# Patient Record
Sex: MALE | Race: WHITE | NOT HISPANIC OR LATINO | Employment: OTHER | URBAN - METROPOLITAN AREA
[De-identification: names, ages, dates, MRNs, and addresses within clinical notes are randomized per-mention and may not be internally consistent; named-entity substitution may affect disease eponyms.]

---

## 2019-03-27 ENCOUNTER — OFFICE VISIT (OUTPATIENT)
Dept: GASTROENTEROLOGY | Facility: CLINIC | Age: 64
End: 2019-03-27
Payer: OTHER GOVERNMENT

## 2019-03-27 VITALS
TEMPERATURE: 97.7 F | SYSTOLIC BLOOD PRESSURE: 133 MMHG | BODY MASS INDEX: 32.65 KG/M2 | HEIGHT: 71 IN | HEART RATE: 65 BPM | DIASTOLIC BLOOD PRESSURE: 75 MMHG | WEIGHT: 233.2 LBS

## 2019-03-27 DIAGNOSIS — Z86.010 HISTORY OF COLON POLYPS: Primary | ICD-10-CM

## 2019-03-27 DIAGNOSIS — K21.9 GASTROESOPHAGEAL REFLUX DISEASE, ESOPHAGITIS PRESENCE NOT SPECIFIED: ICD-10-CM

## 2019-03-27 PROBLEM — Z86.0100 HISTORY OF COLON POLYPS: Status: ACTIVE | Noted: 2019-03-27

## 2019-03-27 PROCEDURE — 99204 OFFICE O/P NEW MOD 45 MIN: CPT | Performed by: INTERNAL MEDICINE

## 2019-03-27 RX ORDER — HYDROXYCHLOROQUINE SULFATE 200 MG/1
200 TABLET, FILM COATED ORAL 2 TIMES DAILY WITH MEALS
COMMUNITY

## 2019-03-27 RX ORDER — LOSARTAN POTASSIUM 25 MG/1
12.5 TABLET ORAL
COMMUNITY

## 2019-03-27 NOTE — PROGRESS NOTES
[facility] - Outpatient Consultation  Ismael Braun 62 y o  male MRN: [id number]  Encounter: [id number]          ASSESSMENT AND PLAN:      There are no diagnoses linked to this encounter   ______________________________________________________________________    HPI:  Ismael Braun is a 62 y o  male is here for a colon consultation  REVIEW OF SYSTEMS:    CONSTITUTIONAL: Denies any fever, chills, rigors, and weight loss  HEENT: No earache or tinnitus  Denies hearing loss or visual disturbances  CARDIOVASCULAR: No chest pain or palpitations  RESPIRATORY: Denies any cough, hemoptysis, shortness of breath or dyspnea on exertion  GASTROINTESTINAL: As noted in the History of Present Illness  GENITOURINARY: No problems with urination  Denies any hematuria or dysuria  NEUROLOGIC: No dizziness or vertigo, denies headaches  MUSCULOSKELETAL: Denies any muscle or joint pain  SKIN: Denies skin rashes or itching  ENDOCRINE: Denies excessive thirst  Denies intolerance to heat or cold  PSYCHOSOCIAL: Denies depression or anxiety  Denies any recent memory loss  Historical Information   No past medical history on file  No past surgical history on file  Social History   Social History     Substance and Sexual Activity   Alcohol Use Not on file     Social History     Substance and Sexual Activity   Drug Use Not on file     Social History     Tobacco Use   Smoking Status Not on file     No family history on file  Meds/Allergies     No current outpatient medications on file  Allergies not on file        Objective     There were no vitals taken for this visit  There is no height or weight on file to calculate BMI          PHYSICAL EXAM:      General Appearance:   Alert, cooperative, no distress   HEENT:   Normocephalic, atraumatic, anicteric      Neck:  Supple, symmetrical, trachea midline   Lungs:   Clear to auscultation bilaterally; no rales, rhonchi or wheezing; respirations unlabored    Heart[de-identified]   Regular rate and rhythm; no murmur, rub, or gallop  Abdomen:   Soft, non-tender, non-distended; normal bowel sounds; no masses, no organomegaly    Genitalia:   Deferred    Rectal:   Deferred    Extremities:  No cyanosis, clubbing or edema    Pulses:  2+ and symmetric    Skin:  No jaundice, rashes, or lesions    Lymph nodes:  No palpable cervical lymphadenopathy        Lab Results:   No visits with results within 1 Day(s) from this visit  Latest known visit with results is:   No results found for any previous visit  Radiology Results:   No results found  Attestation:   By signing my name below, Reece Yap, attest that this documentation has been prepared under the direction and in the presence of Min Del Real MD  Electronically Signed: Ge Jacinto  3/27/19      I,Rico Mancini, personally performed the services described in this documentation  All medical record entries made by the alinaibgraham were at my direction and in my presence  I have reviewed the chart and discharge instructions and agree that the record reflects my personal performance and is accurate and complete   Min Del Real MD  3/27/19

## 2019-03-27 NOTE — PATIENT INSTRUCTIONS
Colon scheduled 5/2/19 at [facility] with [facility]   Supr instructions and sample given in office

## 2019-03-27 NOTE — PROGRESS NOTES
[facility] - Outpatient Consultation  Sherolyn Cockayne 62 y o  male MRN: [id number]  Encounter: [id number]          ASSESSMENT AND PLAN:      1  History of colon polyps  - Patient is 62 y o , has been screened for colon cancer in the past, last colonoscopy was about 10 years ago, he has history of juvenile polyps, denies any family history of GI malignancy or IBD, no alarm symptoms at this point, currently having normal bowel movements  - Will perform colonoscopy, risk and benefits of the procedure were discussed with the patient including but not limited to bleeding, infection, perforation and missing an adenoma  2  GERD  Patient has long-standing history of GERD currently on Tums and his symptoms are controlled  Patient is a former smoker and a social drinker  Previous endoscopy was 1 year ago, as per the patient he has no history of Nair's esophagus  Will continue with current treatment    ______________________________________________________________________    HPI:    61-year-old male patient with past medical history significant for hypertension and mild arthritis  Patient was seen and examined, denies any recent events, currently is tolerating PO route, denies nausea or vomiting, is passing gases and having daily bowel movements of normal consistency, denies abdominal pain, no recent weight loss  Patient disclose a previous colonoscopy 5-10 years ago,at that time he was found to have colonic polyps but he is unaware if polyps were adenoma, he also has history of a polyp 1 he was a kid, the polyp was found by his mother in the stool  REVIEW OF SYSTEMS:    CONSTITUTIONAL: Denies any fever, chills, rigors, and weight loss  HEENT: No earache or tinnitus  Denies hearing loss or visual disturbances  CARDIOVASCULAR: No chest pain or palpitations  RESPIRATORY: Denies any cough, hemoptysis, shortness of breath or dyspnea on exertion    GASTROINTESTINAL: As noted in the History of Present Illness  GENITOURINARY: No problems with urination  Denies any hematuria or dysuria  NEUROLOGIC: No dizziness or vertigo, denies headaches  MUSCULOSKELETAL: Denies any muscle or joint pain  SKIN: Denies skin rashes or itching  ENDOCRINE: Denies excessive thirst  Denies intolerance to heat or cold  PSYCHOSOCIAL: Denies depression or anxiety  Denies any recent memory loss  Historical Information   History reviewed  No pertinent past medical history  Past Surgical History:   Procedure Laterality Date    APPENDECTOMY      REPLACEMENT TOTAL KNEE       Social History   Social History     Substance and Sexual Activity   Alcohol Use Yes     Social History     Substance and Sexual Activity   Drug Use Never     Social History     Tobacco Use   Smoking Status Former Smoker   Smokeless Tobacco Never Used     History reviewed  No pertinent family history  Meds/Allergies       Current Outpatient Medications:     hydroxychloroquine (PLAQUENIL) 200 mg tablet    losartan (COZAAR) 25 mg tablet    Allergies   Allergen Reactions    Statins Myalgia           Objective     Blood pressure 133/75, pulse 65, temperature 97 7 °F (36 5 °C), temperature source Tympanic, height 5' 11" (1 803 m), weight 106 kg (233 lb 3 2 oz)  Body mass index is 32 52 kg/m²  PHYSICAL EXAM:      General Appearance:   Alert, cooperative, no distress   HEENT:   Normocephalic, atraumatic, anicteric      Neck:  Supple, symmetrical, trachea midline   Lungs:   Clear to auscultation bilaterally; no rales, rhonchi or wheezing; respirations unlabored    Heart[de-identified]   Regular rate and rhythm; no murmur, rub, or gallop     Abdomen:   Soft, non-tender, non-distended; normal bowel sounds; no masses, no organomegaly    Genitalia:   Deferred    Rectal:   Deferred    Extremities:  No cyanosis, clubbing or edema    Pulses:  2+ and symmetric    Skin:  No jaundice, rashes, or lesions    Lymph nodes:  No palpable cervical lymphadenopathy  Lab Results:   No visits with results within 1 Day(s) from this visit  Latest known visit with results is:   No results found for any previous visit  Radiology Results:   No results found

## 2019-04-22 ENCOUNTER — ANESTHESIA EVENT (OUTPATIENT)
Dept: PERIOP | Facility: AMBULARY SURGERY CENTER | Age: 64
End: 2019-04-22
Payer: OTHER GOVERNMENT

## 2019-05-02 ENCOUNTER — HOSPITAL ENCOUNTER (OUTPATIENT)
Facility: AMBULARY SURGERY CENTER | Age: 64
Setting detail: OUTPATIENT SURGERY
Discharge: HOME | End: 2019-05-02
Attending: INTERNAL MEDICINE | Admitting: INTERNAL MEDICINE
Payer: OTHER GOVERNMENT

## 2019-05-02 ENCOUNTER — ANESTHESIA (OUTPATIENT)
Dept: PERIOP | Facility: AMBULARY SURGERY CENTER | Age: 64
End: 2019-05-02
Payer: OTHER GOVERNMENT

## 2019-05-02 VITALS
HEART RATE: 60 BPM | SYSTOLIC BLOOD PRESSURE: 111 MMHG | OXYGEN SATURATION: 96 % | BODY MASS INDEX: 31.5 KG/M2 | DIASTOLIC BLOOD PRESSURE: 62 MMHG | RESPIRATION RATE: 18 BRPM | HEIGHT: 71 IN | TEMPERATURE: 97 F | WEIGHT: 225 LBS

## 2019-05-02 DIAGNOSIS — Z86.010 HISTORY OF COLON POLYPS: ICD-10-CM

## 2019-05-02 PROCEDURE — 88305 TISSUE EXAM BY PATHOLOGIST: CPT | Performed by: PATHOLOGY

## 2019-05-02 PROCEDURE — NC001 PR NO CHARGE: Performed by: INTERNAL MEDICINE

## 2019-05-02 PROCEDURE — 45385 COLONOSCOPY W/LESION REMOVAL: CPT | Performed by: INTERNAL MEDICINE

## 2019-05-02 RX ORDER — AMPICILLIN TRIHYDRATE 250 MG
1000 CAPSULE ORAL DAILY
COMMUNITY

## 2019-05-02 RX ORDER — POTASSIUM CHLORIDE 750 MG/1
10 TABLET, EXTENDED RELEASE ORAL DAILY
COMMUNITY

## 2019-05-02 RX ORDER — MELATONIN
1000 DAILY
COMMUNITY

## 2019-05-02 RX ORDER — PROPOFOL 10 MG/ML
INJECTION, EMULSION INTRAVENOUS AS NEEDED
Status: DISCONTINUED | OUTPATIENT
Start: 2019-05-02 | End: 2019-05-02 | Stop reason: SURG

## 2019-05-02 RX ORDER — SODIUM CHLORIDE 9 MG/ML
100 INJECTION, SOLUTION INTRAVENOUS CONTINUOUS
Status: CANCELLED | OUTPATIENT
Start: 2019-05-02

## 2019-05-02 RX ORDER — LIDOCAINE HYDROCHLORIDE 10 MG/ML
INJECTION, SOLUTION INFILTRATION; PERINEURAL AS NEEDED
Status: DISCONTINUED | OUTPATIENT
Start: 2019-05-02 | End: 2019-05-02 | Stop reason: SURG

## 2019-05-02 RX ORDER — SODIUM CHLORIDE 9 MG/ML
INJECTION, SOLUTION INTRAVENOUS CONTINUOUS PRN
Status: DISCONTINUED | OUTPATIENT
Start: 2019-05-02 | End: 2019-05-02 | Stop reason: SURG

## 2019-05-02 RX ADMIN — PROPOFOL 50 MG: 10 INJECTION, EMULSION INTRAVENOUS at 10:46

## 2019-05-02 RX ADMIN — PROPOFOL 50 MG: 10 INJECTION, EMULSION INTRAVENOUS at 10:54

## 2019-05-02 RX ADMIN — SODIUM CHLORIDE: 0.9 INJECTION, SOLUTION INTRAVENOUS at 10:10

## 2019-05-02 RX ADMIN — PROPOFOL 100 MG: 10 INJECTION, EMULSION INTRAVENOUS at 10:43

## 2019-05-02 RX ADMIN — LIDOCAINE HYDROCHLORIDE ANHYDROUS 50 MG: 10 INJECTION, SOLUTION INFILTRATION at 10:43

## 2019-05-09 ENCOUNTER — TELEPHONE (OUTPATIENT)
Dept: GASTROENTEROLOGY | Facility: CLINIC | Age: 64
End: 2019-05-09

## 2021-01-21 ENCOUNTER — OFFICE VISIT (OUTPATIENT)
Dept: URGENT CARE | Age: 66
End: 2021-01-21
Payer: COMMERCIAL

## 2021-01-21 VITALS — RESPIRATION RATE: 20 BRPM | OXYGEN SATURATION: 97 % | TEMPERATURE: 96.5 F | HEART RATE: 98 BPM

## 2021-01-21 DIAGNOSIS — Z20.822 CONTACT WITH AND (SUSPECTED) EXPOSURE TO COVID-19: Primary | ICD-10-CM

## 2021-01-21 DIAGNOSIS — R06.2 WHEEZING: ICD-10-CM

## 2021-01-21 PROCEDURE — U0003 INFECTIOUS AGENT DETECTION BY NUCLEIC ACID (DNA OR RNA); SEVERE ACUTE RESPIRATORY SYNDROME CORONAVIRUS 2 (SARS-COV-2) (CORONAVIRUS DISEASE [COVID-19]), AMPLIFIED PROBE TECHNIQUE, MAKING USE OF HIGH THROUGHPUT TECHNOLOGIES AS DESCRIBED BY CMS-2020-01-R: HCPCS | Performed by: NURSE PRACTITIONER

## 2021-01-21 PROCEDURE — G0382 LEV 3 HOSP TYPE B ED VISIT: HCPCS | Performed by: NURSE PRACTITIONER

## 2021-01-21 PROCEDURE — U0005 INFEC AGEN DETEC AMPLI PROBE: HCPCS | Performed by: NURSE PRACTITIONER

## 2021-01-21 RX ORDER — MONTELUKAST SODIUM 10 MG/1
10 TABLET ORAL
COMMUNITY

## 2021-01-21 RX ORDER — PANTOPRAZOLE SODIUM 40 MG/1
40 TABLET, DELAYED RELEASE ORAL DAILY
COMMUNITY

## 2021-01-21 RX ORDER — ALBUTEROL SULFATE 90 UG/1
2 AEROSOL, METERED RESPIRATORY (INHALATION) EVERY 6 HOURS PRN
Qty: 18 G | Refills: 0 | Status: SHIPPED | OUTPATIENT
Start: 2021-01-21

## 2021-01-21 RX ORDER — DEXAMETHASONE 4 MG/1
4 TABLET ORAL 2 TIMES DAILY WITH MEALS
Qty: 6 TABLET | Refills: 0 | Status: SHIPPED | OUTPATIENT
Start: 2021-01-21 | End: 2021-01-24

## 2021-01-21 NOTE — PROGRESS NOTES
[facility]        NAME: Ashli Mendieta is a 60 y o  male  : 1955    MRN: [id number]  DATE: 2021  TIME: 3:53 PM    Assessment and Plan   Contact with and (suspected) exposure to covid-19 [Z20 822]  1  Contact with and (suspected) exposure to covid-19  Novel Coronavirus (Covid-19),PCR [facility] - Office Collection   2  Wheezing  albuterol (Ventolin HFA) 90 mcg/act inhaler    dexamethasone (DECADRON) 4 mg tablet         Patient Instructions     Start albuterol prn and steroid BID for wheezing and chest tightness  covid tested; results in 2-3 days via MyChart  Follow up with PCP in 3-5 days  Proceed to  ER if symptoms worsen  Chief Complaint     Chief Complaint   Patient presents with    Chills     pt states started last night with dry cough, chills and swollen glands, exposed to friend who is positive for covid    Swollen Glands         History of Present Illness       HPI   Reports chills, swollen glands on the neck, intermittent mild wheezing and some chest tightness  He interacted with a friend who has just recovered from covid 23  Review of Systems   Review of Systems   Constitutional: Positive for chills  HENT: Negative for sore throat and trouble swallowing  Respiratory: Positive for cough, chest tightness (sometimes) and wheezing  Negative for apnea and shortness of breath  Cardiovascular: Negative for chest pain  Gastrointestinal: Negative for diarrhea and vomiting  Neurological: Negative for dizziness and headaches           Current Medications       Current Outpatient Medications:     Cetirizine HCl (ZYRTEC ALLERGY) 10 MG CAPS, Take 1 capsule by mouth daily, Disp: , Rfl:     cholecalciferol (VITAMIN D3) 1,000 units tablet, Take 1,000 Units by mouth daily, Disp: , Rfl:     hydroxychloroquine (PLAQUENIL) 200 mg tablet, Take 200 mg by mouth 2 (two) times a day with meals, Disp: , Rfl:     losartan (COZAAR) 25 mg tablet, Take 12 5 mg by mouth , Disp: , Rfl:    MAGNESIUM PO, Take 1 tablet by mouth daily, Disp: , Rfl:     montelukast (SINGULAIR) 10 mg tablet, Take 10 mg by mouth daily at bedtime, Disp: , Rfl:     pantoprazole (PROTONIX) 40 mg tablet, Take 40 mg by mouth daily, Disp: , Rfl:     potassium chloride (K-DUR,KLOR-CON) 10 mEq tablet, Take 10 mEq by mouth daily, Disp: , Rfl:     albuterol (Ventolin HFA) 90 mcg/act inhaler, Inhale 2 puffs every 6 (six) hours as needed for wheezing, Disp: 18 g, Rfl: 0    Cinnamon 500 MG capsule, Take 1,000 mg by mouth daily, Disp: , Rfl:     dexamethasone (DECADRON) 4 mg tablet, Take 1 tablet (4 mg total) by mouth 2 (two) times a day with meals for 3 days, Disp: 6 tablet, Rfl: 0    Na Sulfate-K Sulfate-Mg Sulf 17 5-3 13-1 6 GM/177ML SOLN, As directed, Disp: 1 Bottle, Rfl: 0    OLIVE LEAF EXTRACT PO, Take 1 capsule by mouth daily, Disp: , Rfl:     Current Allergies     Allergies as of 01/21/2021 - Reviewed 01/21/2021   Allergen Reaction Noted    Statins Myalgia 01/21/2019            The following portions of the patient's history were reviewed and updated as appropriate: allergies, current medications, past family history, past medical history, past social history, past surgical history and problem list      Past Medical History:   Diagnosis Date    Bundle branch block     Frozen shoulder     right    GERD (gastroesophageal reflux disease)     Hypertension     RA [facility]) ([facility] [address] )     Seasonal allergies     Sleep apnea        Past Surgical History:   Procedure Laterality Date    APPENDECTOMY      COLONOSCOPY      EGD      JOINT REPLACEMENT      KNEE ARTHROSCOPY      TX COLONOSCOPY FLX DX W/COLLJ SPEC WHEN PFRMD N/A 5/2/2019    Procedure: COLONOSCOPY;  Surgeon: Judit Andrews MD;  [facility]; Service: Gastroenterology    REPLACEMENT TOTAL KNEE         History reviewed  No pertinent family history  Medications have been verified          Objective   Pulse 98   Temp (!) 96 5 °F (35 8 °C) Resp 20   SpO2 97%   No LMP for male patient  Physical Exam     Physical Exam  Constitutional:       Appearance: He is not ill-appearing or diaphoretic  HENT:      Nose: No rhinorrhea  Cardiovascular:      Rate and Rhythm: Regular rhythm  Heart sounds: Normal heart sounds  Pulmonary:      Effort: Pulmonary effort is normal       Breath sounds: Normal breath sounds  Neurological:      Mental Status: He is alert

## 2021-01-22 LAB — SARS-COV-2 RNA RESP QL NAA+PROBE: NEGATIVE

## 2021-02-16 ENCOUNTER — APPOINTMENT (EMERGENCY)
Dept: RADIOLOGY | Facility: HOSPITAL | Age: 66
DRG: 156 | End: 2021-02-16
Payer: COMMERCIAL

## 2021-02-16 ENCOUNTER — HOSPITAL ENCOUNTER (INPATIENT)
Facility: HOSPITAL | Age: 66
LOS: 1 days | Discharge: HOME | DRG: 156 | End: 2021-02-17
Attending: EMERGENCY MEDICINE | Admitting: INTERNAL MEDICINE
Payer: COMMERCIAL

## 2021-02-16 DIAGNOSIS — R55 NEAR SYNCOPE: Primary | ICD-10-CM

## 2021-02-16 LAB
ALBUMIN SERPL BCP-MCNC: 3.6 G/DL (ref 3.5–5)
ALP SERPL-CCNC: 65 U/L (ref 46–116)
ALT SERPL W P-5'-P-CCNC: 33 U/L (ref 12–78)
ANION GAP SERPL CALCULATED.3IONS-SCNC: 4 MMOL/L (ref 4–13)
AST SERPL W P-5'-P-CCNC: 18 U/L (ref 5–45)
BASOPHILS # BLD AUTO: 0.05 THOUSANDS/ΜL (ref 0–0.1)
BASOPHILS NFR BLD AUTO: 1 % (ref 0–1)
BILIRUB SERPL-MCNC: 0.35 MG/DL (ref 0.2–1)
BILIRUB UR QL STRIP: NEGATIVE
BUN SERPL-MCNC: 21 MG/DL (ref 5–25)
CALCIUM SERPL-MCNC: 9.3 MG/DL (ref 8.3–10.1)
CHLORIDE SERPL-SCNC: 105 MMOL/L (ref 100–108)
CLARITY UR: NORMAL
CO2 SERPL-SCNC: 31 MMOL/L (ref 21–32)
COLOR UR: YELLOW
CREAT SERPL-MCNC: 1 MG/DL (ref 0.6–1.3)
EOSINOPHIL # BLD AUTO: 0.11 THOUSAND/ΜL (ref 0–0.61)
EOSINOPHIL NFR BLD AUTO: 2 % (ref 0–6)
ERYTHROCYTE [DISTWIDTH] IN BLOOD BY AUTOMATED COUNT: 13.1 % (ref 11.6–15.1)
GFR SERPL CREATININE-BSD FRML MDRD: 79 ML/MIN/1.73SQ M
GLUCOSE SERPL-MCNC: 114 MG/DL (ref 65–140)
GLUCOSE UR STRIP-MCNC: NEGATIVE MG/DL
HCT VFR BLD AUTO: 44.1 % (ref 36.5–49.3)
HGB BLD-MCNC: 14.1 G/DL (ref 12–17)
HGB UR QL STRIP.AUTO: NEGATIVE
IMM GRANULOCYTES # BLD AUTO: 0.09 THOUSAND/UL (ref 0–0.2)
IMM GRANULOCYTES NFR BLD AUTO: 1 % (ref 0–2)
KETONES UR STRIP-MCNC: NEGATIVE MG/DL
LEUKOCYTE ESTERASE UR QL STRIP: NEGATIVE
LYMPHOCYTES # BLD AUTO: 1.68 THOUSANDS/ΜL (ref 0.6–4.47)
LYMPHOCYTES NFR BLD AUTO: 24 % (ref 14–44)
MCH RBC QN AUTO: 30.5 PG (ref 26.8–34.3)
MCHC RBC AUTO-ENTMCNC: 32 G/DL (ref 31.4–37.4)
MCV RBC AUTO: 96 FL (ref 82–98)
MONOCYTES # BLD AUTO: 0.73 THOUSAND/ΜL (ref 0.17–1.22)
MONOCYTES NFR BLD AUTO: 10 % (ref 4–12)
NEUTROPHILS # BLD AUTO: 4.49 THOUSANDS/ΜL (ref 1.85–7.62)
NEUTS SEG NFR BLD AUTO: 62 % (ref 43–75)
NITRITE UR QL STRIP: NEGATIVE
NRBC BLD AUTO-RTO: 0 /100 WBCS
PH UR STRIP.AUTO: 6 [PH]
PLATELET # BLD AUTO: 298 THOUSANDS/UL (ref 149–390)
PMV BLD AUTO: 9.4 FL (ref 8.9–12.7)
POTASSIUM SERPL-SCNC: 4.3 MMOL/L (ref 3.5–5.3)
PROT SERPL-MCNC: 7.3 G/DL (ref 6.4–8.2)
PROT UR STRIP-MCNC: NEGATIVE MG/DL
RBC # BLD AUTO: 4.62 MILLION/UL (ref 3.88–5.62)
SODIUM SERPL-SCNC: 140 MMOL/L (ref 136–145)
SP GR UR STRIP.AUTO: 1.01 (ref 1–1.03)
TROPONIN I SERPL-MCNC: 0.08 NG/ML
TROPONIN I SERPL-MCNC: 0.09 NG/ML
UROBILINOGEN UR QL STRIP.AUTO: 0.2 E.U./DL
WBC # BLD AUTO: 7.15 THOUSAND/UL (ref 4.31–10.16)

## 2021-02-16 PROCEDURE — 93005 ELECTROCARDIOGRAM TRACING: CPT

## 2021-02-16 PROCEDURE — 36415 COLL VENOUS BLD VENIPUNCTURE: CPT | Performed by: EMERGENCY MEDICINE

## 2021-02-16 PROCEDURE — 70496 CT ANGIOGRAPHY HEAD: CPT

## 2021-02-16 PROCEDURE — 85025 COMPLETE CBC W/AUTO DIFF WBC: CPT | Performed by: EMERGENCY MEDICINE

## 2021-02-16 PROCEDURE — 81003 URINALYSIS AUTO W/O SCOPE: CPT | Performed by: INTERNAL MEDICINE

## 2021-02-16 PROCEDURE — G1004 CDSM NDSC: HCPCS

## 2021-02-16 PROCEDURE — 84484 ASSAY OF TROPONIN QUANT: CPT | Performed by: EMERGENCY MEDICINE

## 2021-02-16 PROCEDURE — 70498 CT ANGIOGRAPHY NECK: CPT

## 2021-02-16 PROCEDURE — 99285 EMERGENCY DEPT VISIT HI MDM: CPT | Performed by: EMERGENCY MEDICINE

## 2021-02-16 PROCEDURE — 99223 1ST HOSP IP/OBS HIGH 75: CPT | Performed by: INTERNAL MEDICINE

## 2021-02-16 PROCEDURE — 99284 EMERGENCY DEPT VISIT MOD MDM: CPT

## 2021-02-16 PROCEDURE — 80053 COMPREHEN METABOLIC PANEL: CPT | Performed by: EMERGENCY MEDICINE

## 2021-02-16 RX ORDER — ASPIRIN 325 MG
325 TABLET ORAL ONCE
Status: COMPLETED | OUTPATIENT
Start: 2021-02-16 | End: 2021-02-16

## 2021-02-16 RX ORDER — POTASSIUM CHLORIDE 750 MG/1
10 TABLET, EXTENDED RELEASE ORAL DAILY
Status: DISCONTINUED | OUTPATIENT
Start: 2021-02-17 | End: 2021-02-17 | Stop reason: HOSPADM

## 2021-02-16 RX ORDER — HYDROXYCHLOROQUINE SULFATE 200 MG/1
200 TABLET, FILM COATED ORAL 2 TIMES DAILY WITH MEALS
Status: DISCONTINUED | OUTPATIENT
Start: 2021-02-17 | End: 2021-02-17 | Stop reason: HOSPADM

## 2021-02-16 RX ORDER — DOCUSATE SODIUM 100 MG/1
100 CAPSULE, LIQUID FILLED ORAL 2 TIMES DAILY PRN
Status: DISCONTINUED | OUTPATIENT
Start: 2021-02-16 | End: 2021-02-17 | Stop reason: HOSPADM

## 2021-02-16 RX ORDER — MELATONIN
1000 DAILY
Status: DISCONTINUED | OUTPATIENT
Start: 2021-02-17 | End: 2021-02-17 | Stop reason: HOSPADM

## 2021-02-16 RX ORDER — MONTELUKAST SODIUM 10 MG/1
10 TABLET ORAL
Status: DISCONTINUED | OUTPATIENT
Start: 2021-02-16 | End: 2021-02-16

## 2021-02-16 RX ORDER — LOSARTAN POTASSIUM 25 MG/1
12.5 TABLET ORAL DAILY
Status: DISCONTINUED | OUTPATIENT
Start: 2021-02-16 | End: 2021-02-17 | Stop reason: HOSPADM

## 2021-02-16 RX ORDER — LOSARTAN POTASSIUM 25 MG/1
12.5 TABLET ORAL DAILY
Status: DISCONTINUED | OUTPATIENT
Start: 2021-02-17 | End: 2021-02-16

## 2021-02-16 RX ORDER — ASPIRIN 81 MG/1
324 TABLET, CHEWABLE ORAL ONCE
Status: COMPLETED | OUTPATIENT
Start: 2021-02-16 | End: 2021-02-16

## 2021-02-16 RX ORDER — ASPIRIN 325 MG
325 TABLET ORAL DAILY
Status: DISCONTINUED | OUTPATIENT
Start: 2021-02-17 | End: 2021-02-17 | Stop reason: HOSPADM

## 2021-02-16 RX ORDER — LORAZEPAM 2 MG/ML
2 INJECTION INTRAMUSCULAR ONCE
Status: COMPLETED | OUTPATIENT
Start: 2021-02-17 | End: 2021-02-16

## 2021-02-16 RX ORDER — AMPICILLIN TRIHYDRATE 250 MG
1000 CAPSULE ORAL DAILY
Status: DISCONTINUED | OUTPATIENT
Start: 2021-02-17 | End: 2021-02-16 | Stop reason: RX

## 2021-02-16 RX ORDER — ACETAMINOPHEN 325 MG/1
650 TABLET ORAL EVERY 6 HOURS PRN
Status: DISCONTINUED | OUTPATIENT
Start: 2021-02-16 | End: 2021-02-17 | Stop reason: HOSPADM

## 2021-02-16 RX ORDER — PANTOPRAZOLE SODIUM 40 MG/1
40 TABLET, DELAYED RELEASE ORAL DAILY
Status: DISCONTINUED | OUTPATIENT
Start: 2021-02-17 | End: 2021-02-17 | Stop reason: HOSPADM

## 2021-02-16 RX ORDER — HYDRALAZINE HYDROCHLORIDE 20 MG/ML
10 INJECTION INTRAMUSCULAR; INTRAVENOUS EVERY 4 HOURS PRN
Status: DISCONTINUED | OUTPATIENT
Start: 2021-02-16 | End: 2021-02-17 | Stop reason: HOSPADM

## 2021-02-16 RX ORDER — MAGNESIUM HYDROXIDE/ALUMINUM HYDROXICE/SIMETHICONE 120; 1200; 1200 MG/30ML; MG/30ML; MG/30ML
15 SUSPENSION ORAL EVERY 6 HOURS PRN
Status: DISCONTINUED | OUTPATIENT
Start: 2021-02-16 | End: 2021-02-17 | Stop reason: HOSPADM

## 2021-02-16 RX ORDER — ALBUTEROL SULFATE 90 UG/1
2 AEROSOL, METERED RESPIRATORY (INHALATION) EVERY 6 HOURS PRN
Status: DISCONTINUED | OUTPATIENT
Start: 2021-02-16 | End: 2021-02-17 | Stop reason: HOSPADM

## 2021-02-16 RX ORDER — ONDANSETRON 2 MG/ML
4 INJECTION INTRAMUSCULAR; INTRAVENOUS EVERY 6 HOURS PRN
Status: DISCONTINUED | OUTPATIENT
Start: 2021-02-16 | End: 2021-02-17 | Stop reason: HOSPADM

## 2021-02-16 RX ORDER — LORATADINE 10 MG/1
10 TABLET ORAL DAILY
Status: DISCONTINUED | OUTPATIENT
Start: 2021-02-17 | End: 2021-02-17 | Stop reason: HOSPADM

## 2021-02-16 RX ORDER — MONTELUKAST SODIUM 10 MG/1
10 TABLET ORAL EVERY MORNING
Status: DISCONTINUED | OUTPATIENT
Start: 2021-02-17 | End: 2021-02-17 | Stop reason: HOSPADM

## 2021-02-16 RX ORDER — LORAZEPAM 2 MG/ML
1 INJECTION INTRAMUSCULAR EVERY 6 HOURS PRN
Status: DISCONTINUED | OUTPATIENT
Start: 2021-02-16 | End: 2021-02-16

## 2021-02-16 RX ADMIN — ASPIRIN 325 MG ORAL TABLET 325 MG: 325 PILL ORAL at 18:30

## 2021-02-16 RX ADMIN — LOSARTAN POTASSIUM 12.5 MG: 25 TABLET, FILM COATED ORAL at 22:14

## 2021-02-16 RX ADMIN — IOHEXOL 90 ML: 350 INJECTION, SOLUTION INTRAVENOUS at 18:43

## 2021-02-16 RX ADMIN — LORAZEPAM 2 MG: 2 INJECTION INTRAMUSCULAR; INTRAVENOUS at 23:56

## 2021-02-16 RX ADMIN — ASPIRIN 81 MG 324 MG: 81 TABLET ORAL at 18:38

## 2021-02-16 NOTE — ED ATTENDING ATTESTATION
2/16/2021  Juanjo Mcdonough DO, saw and evaluated the patient  I have discussed the patient with the resident/non-physician practitioner and agree with the resident's/non-physician practitioner's findings, Plan of Care, and MDM as documented in the resident's/non-physician practitioner's note, except where noted  All available labs and Radiology studies were reviewed  I was present for key portions of any procedure(s) performed by the resident/non-physician practitioner and I was immediately available to provide assistance  At this point I agree with the current assessment done in the Emergency Department  I have conducted an independent evaluation of this patient a history and physical is as follows:    60 yom with acute dizziness and near syncope while seated at computer at home  +difficulty with word finding for several months  Past Medical History:   Diagnosis Date    Bundle branch block     Frozen shoulder     right    GERD (gastroesophageal reflux disease)     Hypertension     RA (rheumatoid arthritis) (HCC)     Seasonal allergies     Sleep apnea      BP (!) 186/86   Pulse 78   Temp 98 5 °F (36 9 °C) (Oral)   Resp 18   Ht 5' 11" (1 803 m)   Wt 113 kg (250 lb)   SpO2 98%   BMI 34 87 kg/m²    A&Ox4, no focal deficits, RRR, no resp distress, abd soft/NT, ext NROM    Considering TIA vs CVA, dysrhythmia, labyrinthitis, etc    CTA head/neck, admit                  ED Course         Critical Care Time  Procedures

## 2021-02-16 NOTE — ED NOTES
Patient transported to [facility], [address] [place] [address]  02/16/21 7390

## 2021-02-17 ENCOUNTER — APPOINTMENT (INPATIENT)
Dept: NON INVASIVE DIAGNOSTICS | Facility: HOSPITAL | Age: 66
DRG: 156 | End: 2021-02-17
Payer: COMMERCIAL

## 2021-02-17 ENCOUNTER — APPOINTMENT (INPATIENT)
Dept: RADIOLOGY | Facility: HOSPITAL | Age: 66
DRG: 156 | End: 2021-02-17
Payer: COMMERCIAL

## 2021-02-17 VITALS
WEIGHT: 250 LBS | HEART RATE: 80 BPM | OXYGEN SATURATION: 94 % | TEMPERATURE: 98.5 F | DIASTOLIC BLOOD PRESSURE: 63 MMHG | SYSTOLIC BLOOD PRESSURE: 150 MMHG | BODY MASS INDEX: 35 KG/M2 | RESPIRATION RATE: 22 BRPM | HEIGHT: 71 IN

## 2021-02-17 PROBLEM — R77.8 ELEVATED TROPONIN LEVEL NOT DUE MYOCARDIAL INFARCTION: Status: ACTIVE | Noted: 2021-02-17

## 2021-02-17 PROBLEM — R73.09 ELEVATED HEMOGLOBIN A1C: Status: ACTIVE | Noted: 2021-02-17

## 2021-02-17 PROBLEM — R79.89 ELEVATED TROPONIN LEVEL NOT DUE MYOCARDIAL INFARCTION: Status: ACTIVE | Noted: 2021-02-17

## 2021-02-17 LAB
ALBUMIN SERPL BCP-MCNC: 3.2 G/DL (ref 3.5–5)
ALP SERPL-CCNC: 57 U/L (ref 46–116)
ALT SERPL W P-5'-P-CCNC: 29 U/L (ref 12–78)
ANION GAP SERPL CALCULATED.3IONS-SCNC: 3 MMOL/L (ref 4–13)
AST SERPL W P-5'-P-CCNC: 21 U/L (ref 5–45)
ATRIAL RATE: 58 BPM
ATRIAL RATE: 61 BPM
ATRIAL RATE: 66 BPM
ATRIAL RATE: 70 BPM
BASOPHILS # BLD AUTO: 0.05 THOUSANDS/ΜL (ref 0–0.1)
BASOPHILS NFR BLD AUTO: 1 % (ref 0–1)
BILIRUB SERPL-MCNC: 0.53 MG/DL (ref 0.2–1)
BUN SERPL-MCNC: 22 MG/DL (ref 5–25)
CALCIUM ALBUM COR SERPL-MCNC: 9.7 MG/DL (ref 8.3–10.1)
CALCIUM SERPL-MCNC: 9.1 MG/DL (ref 8.3–10.1)
CHLORIDE SERPL-SCNC: 108 MMOL/L (ref 100–108)
CHOLEST SERPL-MCNC: 229 MG/DL (ref 50–200)
CO2 SERPL-SCNC: 32 MMOL/L (ref 21–32)
CREAT SERPL-MCNC: 1.18 MG/DL (ref 0.6–1.3)
EOSINOPHIL # BLD AUTO: 0.13 THOUSAND/ΜL (ref 0–0.61)
EOSINOPHIL NFR BLD AUTO: 2 % (ref 0–6)
ERYTHROCYTE [DISTWIDTH] IN BLOOD BY AUTOMATED COUNT: 13.2 % (ref 11.6–15.1)
EST. AVERAGE GLUCOSE BLD GHB EST-MCNC: 117 MG/DL
GFR SERPL CREATININE-BSD FRML MDRD: 65 ML/MIN/1.73SQ M
GLUCOSE SERPL-MCNC: 98 MG/DL (ref 65–140)
HBA1C MFR BLD: 5.7 %
HCT VFR BLD AUTO: 41.7 % (ref 36.5–49.3)
HDLC SERPL-MCNC: 40 MG/DL
HGB BLD-MCNC: 13.5 G/DL (ref 12–17)
IMM GRANULOCYTES # BLD AUTO: 0.08 THOUSAND/UL (ref 0–0.2)
IMM GRANULOCYTES NFR BLD AUTO: 1 % (ref 0–2)
LDLC SERPL CALC-MCNC: 137 MG/DL (ref 0–100)
LYMPHOCYTES # BLD AUTO: 1.8 THOUSANDS/ΜL (ref 0.6–4.47)
LYMPHOCYTES NFR BLD AUTO: 23 % (ref 14–44)
MAGNESIUM SERPL-MCNC: 2 MG/DL (ref 1.6–2.6)
MCH RBC QN AUTO: 30.9 PG (ref 26.8–34.3)
MCHC RBC AUTO-ENTMCNC: 32.4 G/DL (ref 31.4–37.4)
MCV RBC AUTO: 95 FL (ref 82–98)
MONOCYTES # BLD AUTO: 0.88 THOUSAND/ΜL (ref 0.17–1.22)
MONOCYTES NFR BLD AUTO: 11 % (ref 4–12)
NEUTROPHILS # BLD AUTO: 4.87 THOUSANDS/ΜL (ref 1.85–7.62)
NEUTS SEG NFR BLD AUTO: 62 % (ref 43–75)
NRBC BLD AUTO-RTO: 0 /100 WBCS
P AXIS: 142 DEGREES
P AXIS: 43 DEGREES
P AXIS: 49 DEGREES
P AXIS: 55 DEGREES
PHOSPHATE SERPL-MCNC: 3.7 MG/DL (ref 2.3–4.1)
PLATELET # BLD AUTO: 281 THOUSANDS/UL (ref 149–390)
PMV BLD AUTO: 9.4 FL (ref 8.9–12.7)
POTASSIUM SERPL-SCNC: 4.2 MMOL/L (ref 3.5–5.3)
PR INTERVAL: 158 MS
PR INTERVAL: 160 MS
PR INTERVAL: 166 MS
PR INTERVAL: 176 MS
PROT SERPL-MCNC: 6.7 G/DL (ref 6.4–8.2)
QRS AXIS: -15 DEGREES
QRS AXIS: -23 DEGREES
QRS AXIS: -29 DEGREES
QRS AXIS: 195 DEGREES
QRSD INTERVAL: 144 MS
QRSD INTERVAL: 146 MS
QRSD INTERVAL: 148 MS
QRSD INTERVAL: 148 MS
QT INTERVAL: 416 MS
QT INTERVAL: 434 MS
QT INTERVAL: 434 MS
QT INTERVAL: 444 MS
QTC INTERVAL: 435 MS
QTC INTERVAL: 436 MS
QTC INTERVAL: 449 MS
QTC INTERVAL: 454 MS
RBC # BLD AUTO: 4.37 MILLION/UL (ref 3.88–5.62)
SODIUM SERPL-SCNC: 143 MMOL/L (ref 136–145)
T WAVE AXIS: -4 DEGREES
T WAVE AXIS: 0 DEGREES
T WAVE AXIS: 13 DEGREES
T WAVE AXIS: 171 DEGREES
TRIGL SERPL-MCNC: 260 MG/DL
TSH SERPL DL<=0.05 MIU/L-ACNC: 2.11 UIU/ML (ref 0.36–3.74)
VENTRICULAR RATE: 58 BPM
VENTRICULAR RATE: 61 BPM
VENTRICULAR RATE: 66 BPM
VENTRICULAR RATE: 70 BPM
WBC # BLD AUTO: 7.81 THOUSAND/UL (ref 4.31–10.16)

## 2021-02-17 PROCEDURE — 93005 ELECTROCARDIOGRAM TRACING: CPT

## 2021-02-17 PROCEDURE — 85025 COMPLETE CBC W/AUTO DIFF WBC: CPT | Performed by: INTERNAL MEDICINE

## 2021-02-17 PROCEDURE — 36415 COLL VENOUS BLD VENIPUNCTURE: CPT | Performed by: INTERNAL MEDICINE

## 2021-02-17 PROCEDURE — C8929 TTE W OR WO FOL WCON,DOPPLER: HCPCS

## 2021-02-17 PROCEDURE — G1004 CDSM NDSC: HCPCS

## 2021-02-17 PROCEDURE — 93010 ELECTROCARDIOGRAM REPORT: CPT | Performed by: INTERNAL MEDICINE

## 2021-02-17 PROCEDURE — 83036 HEMOGLOBIN GLYCOSYLATED A1C: CPT | Performed by: INTERNAL MEDICINE

## 2021-02-17 PROCEDURE — 93306 TTE W/DOPPLER COMPLETE: CPT | Performed by: INTERNAL MEDICINE

## 2021-02-17 PROCEDURE — 84100 ASSAY OF PHOSPHORUS: CPT | Performed by: INTERNAL MEDICINE

## 2021-02-17 PROCEDURE — 80053 COMPREHEN METABOLIC PANEL: CPT | Performed by: INTERNAL MEDICINE

## 2021-02-17 PROCEDURE — 70551 MRI BRAIN STEM W/O DYE: CPT

## 2021-02-17 PROCEDURE — 83735 ASSAY OF MAGNESIUM: CPT | Performed by: INTERNAL MEDICINE

## 2021-02-17 PROCEDURE — 80061 LIPID PANEL: CPT | Performed by: INTERNAL MEDICINE

## 2021-02-17 PROCEDURE — 84443 ASSAY THYROID STIM HORMONE: CPT | Performed by: INTERNAL MEDICINE

## 2021-02-17 PROCEDURE — 99239 HOSP IP/OBS DSCHRG MGMT >30: CPT | Performed by: PHYSICIAN ASSISTANT

## 2021-02-17 PROCEDURE — 99245 OFF/OP CONSLTJ NEW/EST HI 55: CPT | Performed by: PSYCHIATRY & NEUROLOGY

## 2021-02-17 RX ADMIN — LOSARTAN POTASSIUM 12.5 MG: 25 TABLET, FILM COATED ORAL at 09:50

## 2021-02-17 RX ADMIN — ASPIRIN 325 MG ORAL TABLET 325 MG: 325 PILL ORAL at 09:50

## 2021-02-17 RX ADMIN — MAGNESIUM OXIDE TAB 400 MG (241.3 MG ELEMENTAL MG) 400 MG: 400 (241.3 MG) TAB at 09:50

## 2021-02-17 RX ADMIN — ENOXAPARIN SODIUM 40 MG: 40 INJECTION SUBCUTANEOUS at 09:51

## 2021-02-17 RX ADMIN — PANTOPRAZOLE SODIUM 40 MG: 40 TABLET, DELAYED RELEASE ORAL at 09:50

## 2021-02-17 RX ADMIN — Medication 1000 UNITS: at 09:50

## 2021-02-17 RX ADMIN — POTASSIUM CHLORIDE 10 MEQ: 750 TABLET, EXTENDED RELEASE ORAL at 09:50

## 2021-02-17 RX ADMIN — LORATADINE 10 MG: 10 TABLET ORAL at 09:50

## 2021-02-17 RX ADMIN — MONTELUKAST SODIUM 10 MG: 10 TABLET, FILM COATED ORAL at 09:50

## 2021-02-17 RX ADMIN — PERFLUTREN 0.6 ML/MIN: 6.52 INJECTION, SUSPENSION INTRAVENOUS at 16:23

## 2021-02-17 RX ADMIN — HYDROXYCHLOROQUINE SULFATE 200 MG: 200 TABLET, FILM COATED ORAL at 07:45

## 2021-02-17 NOTE — CONSULTS
PHYSICAL MEDICINE AND REHABILITATION CONSULT NOTE  Emily Castillo 60 y o  male MRN: [id number]  Unit/Bed#: ED 25 Encounter: [id number]    Requested by (Physician/Service): Can Toth MD  Reason for Consultation:  Assessment of rehabilitation needs    Assessment:  Rehabilitation Diagnosis:    Dizziness    Impaired mobility and self care     Recommendations:  Rehabilitation Plan:   Continue PT/OT while on acute care   Consider work up for transient afib vs hypertensive urgency vs obstructive sleep apnea for cause of dizziness   The patient is currently without deficits and will likely be able to d/c to home once medically stable   Covid-19 Testing: [facility] rehabilitation units require testing within 48 hours of potential admission for all general admissions  Please re-test if needed  *Re-testing is NOT required for patients recovering from COVID-19 infection if isolation has been discontinued per CDC criteria  Medical Co-morbidities Plan:  · HTN  · HLD  · Rheumatoid arthritis   · GERD  · DVT ppx:  lovenox and SCD    Thank you for this consultation  Do not hesitate to contact service with further questions  TIMOTHY Tipton  PM&R    History of Present Illness:  Emily Castillo is a 60 y o  male with a PMH of HTN, GERD, HLD, colon polyps, rheumatoid arthritis and sleep apnea who presented to the [facility] on 2/16/21 with dizziness and word finding difficulties  He was hypertensive in the ER  MRI showed no aucte infarct, intracranial hemorrhage or mass effect  Therapy is pending as well as ECHO  PM&R are consulted for rehabilitation recommendations  The patient was seen in the ER  He reports that his dizziness has resolved  He stated the onset was acute while logging onto a computer  He felt like he "had no blood going to the brain"  He reports that he has gained weight recently      Review of Systems: 10 point ROS negative except for what is noted in HPI    Function:  Prior level of function and living situation:        Current level of function:  Physical Therapy:  Pending   Occupational Therapy:  Pending   Speech Therapy:        Physical Exam:  /59   Pulse 56   Temp 98 5 °F (36 9 °C) (Oral)   Resp 18   Ht 5' 11" (1 803 m)   Wt 113 kg (250 lb)   SpO2 97%   BMI 34 87 kg/m²      No intake or output data in the 24 hours ending 21 1354    Body mass index is 34 87 kg/m²  Physical Exam  HENT:      Head: Normocephalic and atraumatic  Eyes:      Extraocular Movements: Extraocular movements intact  Pulmonary:      Effort: Pulmonary effort is normal    Musculoskeletal: Normal range of motion  Skin:     General: Skin is warm  Neurological:      Mental Status: He is alert and oriented to person, place, and time     Psychiatric:         Mood and Affect: Mood normal         Social History:    Social History     Socioeconomic History    Marital status: Single     Spouse name: None    Number of children: None    Years of education: None    Highest education level: None   Occupational History    None   Social Needs    Financial resource strain: None    Food insecurity     Worry: None     Inability: None    Transportation needs     Medical: None     Non-medical: None   Tobacco Use    Smoking status: Former Smoker     Packs/day: 1 50     Types: Cigarettes     Quit date:      Years since quittin 1    Smokeless tobacco: Never Used   Substance and Sexual Activity    Alcohol use: Yes     Frequency: 2-3 times a week    Drug use: Never    Sexual activity: None   Lifestyle    Physical activity     Days per week: None     Minutes per session: None    Stress: None   Relationships    Social connections     Talks on phone: None     Gets together: None     Attends Muslim service: None     Active member of club or organization: None     Attends meetings of clubs or organizations: None     Relationship status: None    Intimate partner violence     Fear of current or ex partner: None     Emotionally abused: None     Physically abused: None     Forced sexual activity: None   Other Topics Concern    None   Social History Narrative    None        Family History:    History reviewed  No pertinent family history        Medications:     Current Facility-Administered Medications:     acetaminophen (TYLENOL) tablet 650 mg, 650 mg, Oral, Q6H PRN, Lenin Shafer MD    albuterol (PROVENTIL HFA,VENTOLIN HFA) inhaler 2 puff, 2 puff, Inhalation, Q6H PRN, Lenin Shafer MD    aluminum-magnesium hydroxide-simethicone (MYLANTA) oral suspension 15 mL, 15 mL, Oral, Q6H PRN, Lenin Shafer MD    aspirin tablet 325 mg, 325 mg, Oral, Daily, Lenin Shafer MD, 325 mg at 02/17/21 0950    cholecalciferol (VITAMIN D3) tablet 1,000 Units, 1,000 Units, Oral, Daily, Lenin Shafer MD, 1,000 Units at 02/17/21 0950    docusate sodium (COLACE) capsule 100 mg, 100 mg, Oral, BID PRN, Lenin Shafer MD    enoxaparin (LOVENOX) subcutaneous injection 40 mg, 40 mg, Subcutaneous, Daily, Lenin Shafer MD, 40 mg at 02/17/21 0951    hydrALAZINE (APRESOLINE) injection 10 mg, 10 mg, Intravenous, Q4H PRN, Lenin Shafer MD    hydroxychloroquine (PLAQUENIL) tablet 200 mg, 200 mg, Oral, BID With Meals, Lenin Shafer MD, 200 mg at 02/17/21 0745    loratadine (CLARITIN) tablet 10 mg, 10 mg, Oral, Daily, Lenin Shafer MD, 10 mg at 02/17/21 0950    losartan (COZAAR) tablet 12 5 mg, 12 5 mg, Oral, Daily, Lenin Shafer MD, 12 5 mg at 02/17/21 0950    magnesium oxide (MAG-OX) tablet 400 mg, 400 mg, Oral, Daily, Lenin Shafer MD, 400 mg at 02/17/21 0950    montelukast (SINGULAIR) tablet 10 mg, 10 mg, Oral, QAM, Triage Protocol Emergency, MD, 10 mg at 02/17/21 0950    ondansetron (ZOFRAN) injection 4 mg, 4 mg, Intravenous, Q6H PRN, Lenin Shafer MD    pantoprazole (PROTONIX) EC tablet 40 mg, 40 mg, Oral, Daily, Lenin Shafer MD, 40 mg at 02/17/21 0950    potassium chloride (K-DUR,KLOR-CON) CR tablet 10 mEq, 10 mEq, Oral, Daily, Noemi Klein MD, 10 mEq at 02/17/21 7019    Current Outpatient Medications:     albuterol (Ventolin HFA) 90 mcg/act inhaler, Inhale 2 puffs every 6 (six) hours as needed for wheezing, Disp: 18 g, Rfl: 0    Cetirizine HCl (ZYRTEC ALLERGY) 10 MG CAPS, Take 1 capsule by mouth daily, Disp: , Rfl:     cholecalciferol (VITAMIN D3) 1,000 units tablet, Take 1,000 Units by mouth daily, Disp: , Rfl:     Cinnamon 500 MG capsule, Take 1,000 mg by mouth daily, Disp: , Rfl:     hydroxychloroquine (PLAQUENIL) 200 mg tablet, Take 200 mg by mouth 2 (two) times a day with meals, Disp: , Rfl:     losartan (COZAAR) 25 mg tablet, Take 12 5 mg by mouth , Disp: , Rfl:     MAGNESIUM PO, Take 1 tablet by mouth daily, Disp: , Rfl:     montelukast (SINGULAIR) 10 mg tablet, Take 10 mg by mouth daily at bedtime, Disp: , Rfl:     Na Sulfate-K Sulfate-Mg Sulf 17 5-3 13-1 6 GM/177ML SOLN, As directed, Disp: 1 Bottle, Rfl: 0    OLIVE LEAF EXTRACT PO, Take 1 capsule by mouth daily, Disp: , Rfl:     pantoprazole (PROTONIX) 40 mg tablet, Take 40 mg by mouth daily, Disp: , Rfl:     potassium chloride (K-DUR,KLOR-CON) 10 mEq tablet, Take 10 mEq by mouth daily, Disp: , Rfl:     Past Medical History:     Past Medical History:   Diagnosis Date    Bundle branch block     Frozen shoulder     right    GERD (gastroesophageal reflux disease)     Hypertension     RA [facility]rhe[facility]oid arthritis) ([facility] [address]     Seasonal allergies     Sleep apnea         Past Surgical History:     Past Surgical History:   Procedure Laterality Date    APPENDECTOMY      COLONOSCOPY      EGD      JOINT REPLACEMENT      KNEE ARTHROSCOPY      TN COLONOSCOPY FLX DX W/COLLJ SPEC WHEN PFRMD N/A 5/2/2019    Procedure: COLONOSCOPY;  Surgeon: Larissa Vizcarra MD;  Location[facility]; Service: Gastroenterology    REPLACEMENT TOTAL KNEE           Allergies:      Allergies Allergen Reactions    Statins Myalgia           LABORATORY RESULTS:      Lab Results   Component Value Date    HGB 13 5 02/17/2021    HCT 41 7 02/17/2021    WBC 7 81 02/17/2021     Lab Results   Component Value Date    BUN 22 02/17/2021    K 4 2 02/17/2021     02/17/2021    CREATININE 1 18 02/17/2021     No results found for: PROTIME, INR     DIAGNOSTIC STUDIES: Reviewed  Cta Head And Neck With And Without Contrast    Result Date: 2/16/2021  Impression: 1  No evidence of acute vascular territorial infarction, intracranial hemorrhage or mass effect  2   No stenosis, dissection or occlusion of the carotid or vertebral arteries or major vessels of the [place]  Workstation performed: [id number]     Mri Brain Wo Contrast    Result Date: 2/17/2021  Impression: No evidence of acute infarct, intracranial hemorrhage or mass effect   Workstation performed: [id number]

## 2021-02-17 NOTE — ASSESSMENT & PLAN NOTE
· Troponins: 0 09, 0 08  · EKG does not demonstrate any evidence of acute ischemia  · ECHO: pending at time of discharge

## 2021-02-17 NOTE — DISCHARGE INSTRUCTIONS
Prediabetes   WHAT YOU NEED TO KNOW:   What is prediabetes? Prediabetes is a blood glucose (sugar) level that is higher than normal  It is not high enough to be considered diabetes  Prediabetes increases your risk for type 2 diabetes and heart disease  What increases my risk for prediabetes? · Being overweight or obese, with a body mass index (BMI) of 25 or higher (23 or higher if you are  American)    · Lack of physical activity    · Older age    · Family history of diabetes (parent or sibling)    · A history of heart disease, gestational diabetes, or polycystic ovary syndrome (PCOS)    · High blood pressure or cholesterol levels    · Being [place] American, , , [place] American, or     · In children, having a mother with diabetes or gestational diabetes mellitus (GDM) during the pregnancy    What are the signs and symptoms of prediabetes? Prediabetes may not cause any symptoms  You may be at risk for diabetes if you have darkened skin on your neck, armpits, and elbows  Any of the following symptoms mean you have moved from prediabetes to diabetes type 2:  · More hunger or thirst than usual    · Frequent urination    · Constant exhaustion    · Blurred vision    How is prediabetes diagnosed? Tests can find prediabetes even if no signs or symptoms have started  If tests are negative for prediabetes, they may be repeated every 3 years if the risk stays high  Adults are tested starting at age 39, or before 39 with a high risk for diabetes  Children who are overweight or obese are tested at the start of puberty, or as early as 8years of age  The following tests are used to check for prediabetes:  · An A1c test  shows the average amount of sugar in your blood over the past 2 to 3 months  ? An A1c of 5 6% or lower means you do not have diabetes  ? An A1c of 5 7% to 6 4% means you have prediabetes and are at risk for diabetes  ?  An A1c of 6 5% or higher means you have diabetes  · A fasting plasma glucose test  is when your blood sugar level is tested after you have not eaten for 8 hours  · A 2-hour plasma glucose test  starts with a blood sugar level check after you have not eaten for 8 hours  You are then given a glucose drink  Your blood sugar level is checked after 2 hours  How do I prevent or delay type 2 diabetes? Healthy choices work best to delay or prevent type 2 diabetes  You can decrease your risk for type 2 diabetes by choosing the following:  · Get regular physical activity  Physical activity, such as exercise, can help decrease your blood sugar level  It can also help to decrease your risk for heart disease and help you lose weight  Adults should get at least 150 minutes (2 5 hours) of moderate physical activity every week  Spread the amount of activity over at least 3 days a week  Do not skip more than 2 days in a row  Children should get at least 60 minutes of moderate physical activity on most days of the week  Examples of moderate physical activity include brisk walking, running, and swimming  Do not sit for longer than 30 minutes at a time  Work with your healthcare provider to create a plan for physical activity  · Lose weight if you are overweight  A weight loss of 7% of your body weight can help to lower your blood sugar level  Your healthcare provider can tell you what weight is healthy for you  He or she can help you create a weight loss plan  · Eat healthy foods  Eat a variety of fruits and vegetables  Eat whole-grain foods more often  Choose dairy foods, meat, and other protein foods that are low in fat  Eat fewer sweets, such as candy, cookies, regular soda, and sweetened drinks  You can also decrease calories by eating smaller portion sizes  Work with your healthcare provider or dietitian to develop a meal plan that is right for you  · Take medicine as directed    Your healthcare provider may give diabetes medicine if you are at high risk for diabetes  You may also need medicines for high blood pressure and high cholesterol  · Follow up with your healthcare provider as directed  You will need to return every year to get tested for diabetes  · Do not smoke  Smoking may increase your risk for type 2 diabetes  Nicotine can damage blood vessels  Other health conditions, such as lung disease, can develop when you smoke  Do not use e-cigarettes or smokeless tobacco in place of cigarettes or to help you quit  They still contain nicotine  Ask your healthcare provider for information if you currently smoke and need help quitting  When should I call my doctor? · You have more hunger or thirst than usual     · You are urinating more often than usual     · You are always exhausted  · You have blurred vision  · You have questions or concerns about your condition or care  CARE AGREEMENT:   You have the right to help plan your care  Learn about your health condition and how it may be treated  Discuss treatment options with your healthcare providers to decide what care you want to receive  You always have the right to refuse treatment  The above information is an  only  It is not intended as medical advice for individual conditions or treatments  Talk to your doctor, nurse or pharmacist before following any medical regimen to see if it is safe and effective for you  © Copyright [address] [place] [address] Information is for End User's use only and may not be sold, redistributed or otherwise used for commercial purposes  All illustrations and images included in CareNotes® are the copyrighted property of A D A M , Inc  or [facility] David Pardo      Prediabetes   WHAT YOU NEED TO KNOW:   Prediabetes is a blood glucose (sugar) level that is higher than normal  It is not high enough to be considered diabetes  Prediabetes increases your risk for type 2 diabetes and heart disease    DISCHARGE INSTRUCTIONS:   Call your doctor if:   · You have more hunger or thirst than usual     · You are urinating more often than usual     · You are always exhausted  · You have blurred vision  · You have questions or concerns about your condition or care  Medicines:   · Medicine  may be given if you are at high risk for type 2 diabetes  Medicine may also be given to lower high blood pressure and high cholesterol  · Take your medicine as directed  Contact your healthcare provider if you think your medicine is not helping or if you have side effects  Tell him or her if you are allergic to any medicine  Keep a list of the medicines, vitamins, and herbs you take  Include the amounts, and when and why you take them  Bring the list or the pill bottles to follow-up visits  Carry your medicine list with you in case of an emergency  Prevent or delay type 2 diabetes:  Healthy choices work best to delay or prevent type 2 diabetes  You can decrease your risk for type 2 diabetes by choosing the following:  · Get regular physical activity  Physical activity, such as exercise, can help decrease your blood sugar level  It can also help to decrease your risk for heart disease and help you lose weight  Adults should get at least 150 minutes (2 5 hours) of moderate physical activity every week  Spread the amount of activity over at least 3 days a week  Do not skip more than 2 days in a row  Children should get at least 60 minutes of moderate physical activity on most days of the week  Examples of moderate physical activity include brisk walking, running, and swimming  Do not sit for longer than 30 minutes at a time  Work with your healthcare provider to create a plan for physical activity  · Lose weight if you are overweight  A weight loss of 7% of your body weight can help to lower your blood sugar level  Your healthcare provider can tell you what weight is healthy for you  He or she can help you create a weight loss plan      · Eat healthy foods  Eat a variety of fruits and vegetables  Eat whole-grain foods more often  Choose dairy foods, meat, and other protein foods that are low in fat  Eat fewer sweets, such as candy, cookies, regular soda, and sweetened drinks  You can also decrease calories by eating smaller portion sizes  Work with your healthcare provider or dietitian to develop a meal plan that is right for you  · Take medicine as directed  Your healthcare provider may give diabetes medicine if you are at high risk for diabetes  You may also need medicines for high blood pressure and high cholesterol  · Follow up with your healthcare provider as directed  You will need to return every year to get tested for diabetes  · Do not smoke  Smoking may increase your risk for type 2 diabetes  Nicotine can damage blood vessels  Other health conditions, such as lung disease, can develop when you smoke  Do not use e-cigarettes or smokeless tobacco in place of cigarettes or to help you quit  They still contain nicotine  Ask your healthcare provider for information if you currently smoke and need help quitting  Follow up with your doctor as directed: You will need to return every year to get tested for diabetes  Write down your questions so you remember to ask them during your visits  © Copyright [address] [place] [address] Drive Information is for End User's use only and may not be sold, redistributed or otherwise used for commercial purposes  All illustrations and images included in CareNotes® are the copyrighted property of A D A M , Inc  or [facility] David Alonso   The above information is an  only  It is not intended as medical advice for individual conditions or treatments  Talk to your doctor, nurse or pharmacist before following any medical regimen to see if it is safe and effective for you

## 2021-02-17 NOTE — ASSESSMENT & PLAN NOTE
Dizziness lasted for 30 seconds  Noted patient's blood pressure elevated at 444 systolic  Also patient with word-finding difficulty for 30 seconds  Although CTA is unremarkable for any brain parenchymal disease; note of normal small posterior circulation  MRI  Stroke protocol  Lipid profile, TSH hemoglobin A1c  Neurology consult, case management, occupational physical therapy consult, PMR consult  Aspirin  Patient cannot take statin as of the moment due to complaints of myalgia  Telemetry    Echocardiogram

## 2021-02-17 NOTE — ED NOTES
Carleen Kilgore patient daughter called for an update  Updated her on current plan of care  Requesting for pt to call her when he is able to        Pelon Grier RN  02/17/21 [id number]

## 2021-02-17 NOTE — ASSESSMENT & PLAN NOTE
· Episode of dizziness and word-finding difficulty lasted for 30 seconds  · Noted patient's blood pressure elevated at 106 systolic on admission; however, subsequent BPs have been acceptable  · CTA head/neck: No evidence of acute vascular territorial infarction, intracranial hemorrhage or mass effect  No stenosis, dissection or occlusion of the carotid or vertebral arteries or major vessels of the [place]  · MRI brain: No evidence of acute infarct, intracranial hemorrhage or mass effect  · Neurology consulted  No additional work up warranted at this time  · Lipid profile: , , , HDL 40   · Continue fish oil  · Diet and exercise  · TSH normal @ 2 110  · Hemoglobin A1c slightly above normal @ 5 7  · Patient cannot take statin as of the moment due to complaints of myalgia  · ECHO: pending at time of discharge  u  · Differential diagnoses include: transient arrhythmia, hypoglycemia, hypertensive urgency, hypoxia secondary to underlying untreated sleep apnea  · Consider out-patient holter monitor  Discuss with primary cardiologist   · Encourage treatment of sleep apnea  Recommend least invasive mask possible (nasal pillows)  · Serial monitoring of blood pressures and blood sugars

## 2021-02-17 NOTE — SPEECH THERAPY NOTE
Speech/Language Assessment    Patient Name: Ashli Mendieta 60 y o  Today's Date: 2/17/2021      CURRENT MEDICAL:  Ashli Mendieta is a 60 y o  male who has a past medical history significant for essential hypertension, gastroesophageal reflux disease, hyperlipidemia but not on any statin due myalgia, history of colon polyps, rheumatoid arthritis and sleep apnea  Patient comes in due to concerns of dizziness with word-finding difficulty as he was doing computer work  It lasted approximately 30 seconds and even after that there was some residual word-finding difficulty and somewhat dizziness which slowly resolved on its own  Currently patient is asymptomatic  Patient was noted to be seen by his primary cardiologist last Friday and they did not do any medication changes  Currently here in the emergency room blood pressure has been elevated at 186/86, 187/81  Reportedly patient has gained weight approximately 30 lb with his usual weight at 220 lb currently at 250 lb which she gain over the past year due to lack of exercise  DX: dizziness, RA, HTN, GERD  MRI of brain completed:  No evidence of acute infarct, intracranial hemorrhage or mass effect  Pt passed RN Dysphagia Assessment  Communication deficits denied  No additional inpatient Speech Pathology evaluation appears indicated at this time  Please re-consult if additional concerns arise  Thank you

## 2021-02-17 NOTE — ASSESSMENT & PLAN NOTE
3 60year-old with HTN, HLD, and rheumatoid arthritis who presented on 2/16 for acute onset lightheadedness that improved within 15 seconds while patient was sitting at a computer at home  No reported associated symptoms  BP on arrival was 186/86  CTA head and neck showed no stenosis, dissection, or occlusion  MRI brain was negative for acute infarction  Unclear etiology, do not suspect TIA at this time  Suspect cerebral hypoperfusion in the setting of possible transient cardiac arrhythmia or hypotension  Plan:  - Echocardiogram pending  - No indication for ASA 325mg from a neurological standpoint   - Can consider cardiac event monitoring if episode recurs  - No additional neurological testing recommended at this time  - Patient is reportedly intolerant to statin medications (myalgias)  - PT/OT/ST   - Neuro checks  - Stroke education  - Telemetry  - Notify Neurology with any changes in neuro examination  - Medical management and supportive care as per primary team  - No further inpatient Neurological recommendations, please call with questions/concerns  Results:  1  MRI brain: No evidence of acute infarct, intracranial hemorrhage or mass effect  2  CTA head and neck:  No evidence of acute vascular territorial infarction, intracranial hemorrhage or mass effect  No stenosis, dissection or occlusion of the carotid or vertebral arteries or major vessels of the [place] of [place]  3  Lipid panel: LDL of 137  4  HgbA1c: 5 7  5   TSH: 2 110

## 2021-02-17 NOTE — UTILIZATION REVIEW
Initial Clinical Review    Admission: Date/Time/Statement:   Admission Orders (From admission, onward)     Ordered        02/16/21 2110  INPATIENT ADMISSION  Once                Orders Placed This Encounter   Procedures    INPATIENT ADMISSION     Standing Status:   Standing     Number of Occurrences:   1     Order Specific Question:   Level of Care     Answer:   Med Surg [16]     Order Specific Question:   Estimated length of stay     Answer:   More than 2 Midnights     Order Specific Question:   Certification     Answer:   I certify that inpatient services are medically necessary for this patient for a duration of greater than two midnights  See H&P and MD Progress Notes for additional information about the patient's course of treatment  ED Arrival Information     Expected Arrival Acuity Service Admission Type    - 2/16/2021 16:11 Urgent Hospitalist Urgent    Arrival Complaint    Dizziness     Chief Complaint   Patient presents with    Dizziness     pt reports was working at his computer today when he became dizzy that was "really quick" and self limiting  denies CP/headache  reports saw cardiology recently for new HTN  reports he is mildly dizzy now  History of Present Illness:  60year old male with PMHx essential HTN, HLD not on statins 2nd myalgia, VICKY, GERD,  colon polyps, rheumatoid arthritis  Presented urgently to [facility] ED on 2/16/21 2nd dizziness with word-finding difficulty when doing computer work  It lasted approximately 30 seconds and even after that there was some residual word-finding difficulty and somewhat dizziness which slowly resolved on its own  Currently patient is asymptomatic  Patient was noted to be seen by his primary cardiologist last Friday and they did not do any medication changes  Currently here in the emergency room blood pressure has been elevated at 186/86, 187/81    Reportedly patient has gained weight approximately 30 lb with his usual weight at 220 lb currently at 250 lb which she gain over the past year due to lack of exercise  Patient denies any chest pain  No previous history of heart attacks or coronary artery disease, no increase in edema  Patient states that there may have been increase in exercise intolerance with the weight gain    Currently, patient is laying flat on bed  He is able to converse in long sentences  There is no note of any conversational dyspnea  No chest pain or distress  Although the intracranial vasculature shows patency and are described to be normal the posterior cerebral arteries are described as:  Normal right and tiny left posterior communicating arteries   No stenosis in the posterior cerebral arteries"  Assessment/ Plan:  Dizziness and giddiness  Assessment & Plan  Dizziness lasted for 30 seconds  Noted patient's blood pressure elevated at 314 systolic  Also patient with word-finding difficulty for 30 seconds  Although CTA is unremarkable for any brain parenchymal disease; note of normal small posterior circulation  MRI  Stroke protocol  Lipid profile, TSH hemoglobin A1c  Neurology consult, case management, occupational physical therapy consult, PMR consult  Aspirin  Patient cannot take statin as of the moment due to complaints of myalgia  Telemetry  Echocardiogram      Rheumatoid arthritis of multiple sites with negative rheumatoid factor (HCC)  Assessment & Plan  Continue Plaquenil 200 mg t i d         VTE Prophylaxis: Enoxaparin (Lovenox)  / sequential compression device     Anticipated Length of Stay:  Patient will be admitted on an Inpatient basis with an anticipated length of stay of  greater than 2 midnights  2/17/21 Neurology:  Assessment/Plan   * Dizziness and giddiness   60year-old with HTN, HLD, and rheumatoid arthritis who presented on 2/16 for acute onset lightheadedness that improved within 15 seconds while patient was sitting at a computer at home  No reported associated symptoms   BP on arrival was 186/86  CTA head and neck showed no stenosis, dissection, or occlusion  MRI brain was negative for acute infarction  Unclear etiology, do not suspect TIA at this time  Suspect cerebral hypoperfusion in the setting of possible transient cardiac arrhythmia or hypotension       Plan:  - Echocardiogram pending  - No indication for ASA 325mg from a neurological standpoint   - Can consider cardiac event monitoring if episode recurs  - No additional neurological testing recommended at this time  - Patient is reportedly intolerant to statin medications (myalgias)  - PT/OT/ST   - Neuro checks  - Stroke education  - Telemetry  - Notify Neurology with any changes in neuro examination  - Medical management and supportive care as per primary team  - No further inpatient Neurological recommendations, please call with questions/concerns      Results:  1  MRI brain: No evidence of acute infarct, intracranial hemorrhage or mass effect  2  CTA head and neck:  No evidence of acute vascular territorial infarction, intracranial hemorrhage or mass effect  No stenosis, dissection or occlusion of the carotid or vertebral arteries or major vessels of the [place]  3  Lipid panel: LDL of 137  4  HgbA1c: 5 7  5   TSH: 2 110     ED Triage Vitals   Temperature Pulse Respirations Blood Pressure SpO2   02/16/21 1654 02/16/21 1648 02/16/21 1648 02/16/21 1648 02/16/21 1648   98 5 °F (36 9 °C) 78 18 (!) 186/86 98 %      Temp Source Heart Rate Source Patient Position - Orthostatic VS BP Location FiO2 (%)   02/16/21 1654 02/16/21 1830 02/16/21 1830 02/16/21 1830 --   Oral Monitor Sitting Right arm       Wt Readings from Last 1 Encounters:   02/16/21 113 kg (250 lb)     Additional Vital Signs:   02/17/21 0700  --  58  11Abnormal   111/65  83  97 %  None (Room air)  Lying   02/17/21 0600  --  62  17  109/57  74  95 %  None (Room air)  Lying   02/17/21 0400  --  64  17  109/60  76  96 %  None (Room air)  Lying   02/17/21 0200  -- 72  19  111/61  80  95 %  None (Room air)  Lying   02/17/21 0100  --  68  19  116/61  82  95 %  None (Room air)  Lying   02/17/21 0000  --  72  17  135/67  --  94 %  None (Room air)  Lying   02/16/21 2300  --  68  21  138/61  88  96 %  None (Room air)  Lying   02/16/21 2200  --  74  18  156/70  98  96 %  None (Room air)  Lying   02/16/21 1930  --  68  19  187/81Abnormal   116  97 %  None (Room air)  Lying   02/16/21 1830  --  76  20  141/73  100  97 %  None (Room air)       Pertinent Labs/Diagnostic Test Results:     Results from last 7 days   Lab Units 02/17/21  0423 02/16/21  1742   WBC Thousand/uL 7 81 7 15   HEMOGLOBIN g/dL 13 5 14 1   HEMATOCRIT % 41 7 44 1   PLATELETS Thousands/uL 281 298   NEUTROS ABS Thousands/µL 4 87 4 49     Results from last 7 days   Lab Units 02/17/21  0423 02/16/21  1742   SODIUM mmol/L 143 140   POTASSIUM mmol/L 4 2 4 3   CHLORIDE mmol/L 108 105   CO2 mmol/L 32 31   ANION GAP mmol/L 3* 4   BUN mg/dL 22 21   CREATININE mg/dL 1 18 1 00   EGFR ml/min/1 73sq m 65 79   CALCIUM mg/dL 9 1 9 3   MAGNESIUM mg/dL 2 0  --    PHOSPHORUS mg/dL 3 7  --      Results from last 7 days   Lab Units 02/17/21  0423 02/16/21  1742   AST U/L 21 18   ALT U/L 29 33   ALK PHOS U/L 57 65   TOTAL PROTEIN g/dL 6 7 7 3   ALBUMIN g/dL 3 2* 3 6   TOTAL BILIRUBIN mg/dL 0 53 0 35     Results from last 7 days   Lab Units 02/17/21  0423 02/16/21  1742   GLUCOSE RANDOM mg/dL 98 114     Results from last 7 days   Lab Units 02/17/21 0423   HEMOGLOBIN A1C % 5 7*   EAG mg/dl 117     Results from last 7 days   Lab Units 02/16/21 2011 02/16/21  1742   TROPONIN I ng/mL 0 08* 0 09*     CTA NECK AND BRAIN WITH AND WITHOUT CONTRAST  1   No evidence of acute vascular territorial infarction, intracranial hemorrhage or mass effect  2   No stenosis, dissection or occlusion of the carotid or vertebral arteries or major vessels of the [place]       MRI BRAIN WITHOUT CONTRAST   No evidence of acute infarct, intracranial hemorrhage or mass effect       ED Treatment:   Medication Administration from 02/16/2021 1611 to 02/17/2021 1803       Date/Time Order Dose Route Action     02/16/2021 1830 aspirin tablet 325 mg 325 mg Oral Given     02/16/2021 1838 aspirin chewable tablet 324 mg 324 mg Oral Given     02/16/2021 1843 iohexol (OMNIPAQUE) 350 MG/ML injection (SINGLE-DOSE) 100 mL 90 mL Intravenous Given     02/17/2021 1602 hydroxychloroquine (PLAQUENIL) tablet 200 mg 200 mg Oral Not Given     02/16/2021 2356 LORazepam (ATIVAN) injection 2 mg 2 mg Intravenous Given     Past Medical History:   Diagnosis Date    Bundle branch block     Frozen shoulder     right    GERD (gastroesophageal reflux disease)     Hypertension     RA (rheumatoid arthritis) (HCC)     Seasonal allergies     Sleep apnea      Admitting Diagnosis: Dizziness [R42]    Age/Sex: 60 y o  male    Admission Orders:  TELEMETRY  VS + Neuro Checks Q1HR X 4 - Q4HRS  NIH STROKE SCALE Q24hrs  X 2  UP + OOB as Tolerated  Diet Cardiovascular; Cardiac  I+O q shift  MRI Brain  ECHO  PT + OT Evals     Scheduled Medications:  aspirin, 325 mg, Oral, Daily  cholecalciferol, 1,000 Units, Oral, Daily  enoxaparin, 40 mg, Subcutaneous, Daily  hydroxychloroquine, 200 mg, Oral, BID With Meals  loratadine, 10 mg, Oral, Daily  losartan, 12 5 mg, Oral, Daily  magnesium oxide, 400 mg, Oral, Daily  montelukast, 10 mg, Oral, QAM  pantoprazole, 40 mg, Oral, Daily  potassium chloride, 10 mEq, Oral, Daily     PRN Meds:  acetaminophen, 650 mg, Oral, Q6H PRN  albuterol, 2 puff, Inhalation, Q6H PRN  aluminum-magnesium hydroxide-simethicone, 15 mL, Oral, Q6H PRN  docusate sodium, 100 mg, Oral, BID PRN  hydrALAZINE, 10 mg, Intravenous, Q4H PRN  ondansetron, 4 mg, Intravenous, Q6H PRN    IP CONSULT TO PHYSICAL MEDICINE REHAB  IP CONSULT TO NEUROLOGY  IP CONSULT TO CASE MANAGEMENT  IP CONSULT TO NUTRITION SERVICES    Network Utilization Review Department  ATTENTION: Please call with any questions or concerns to [phone] and carefully listen to the prompts so that you are directed to the right person  All voicemails are confidential   Alejandro Malin all requests for admission clinical reviews, approved or denied determinations and any other requests to dedicated fax number below belonging to the campus where the patient is receiving treatment   List of dedicated fax numbers for the Facilities:  [address] DENIALS (Administrative/Medical Necessity) [phone]   [address] (Maternity/NICU/Pediatrics) [phone]   [address] [place] [address] [address] Dean [address] (  Nikos Dorman "Myrtle" 103) [address] [address] [address] [address]ce [address] [facility]) [address] [place] [address] [phone]

## 2021-02-17 NOTE — ASSESSMENT & PLAN NOTE
· BPs appear well controlled at this time  · Continue Cozaar 12 5 mg daily  · Will also check echocardiogram x1  (Apparently patient has never had an echocardiogram in the past)

## 2021-02-17 NOTE — ASSESSMENT & PLAN NOTE
As per patient, he was recently seen by his own Cardiologist last Friday and no increase in medication was done  Weight gain of approximately 30 lb over the past year due to lack of exercise  May need increase in Cozaar does when he gets discharged  Will continue Cozaar 12 5 mg daily  Add hydralazine 10 mg intravenous Q 4 p r n  if greater 170 mm Hg  Will also check echocardiogram x1  (Apparently patient has never had an echocardiogram in the past)

## 2021-02-17 NOTE — DISCHARGE SUMMARY
Discharge- Katiana Devoid 1955, 60 y o  male MRN: [id number]  Unit/Bed#: ED 25 Encounter: [id number]  Primary Care Provider: No primary care provider on file  Date and time admitted to hospital: 2/16/2021  4:43 PM    * Dizziness and giddiness-resolved as of 2/17/2021  Assessment & Plan  · Episode of dizziness and word-finding difficulty lasted for 30 seconds  · Noted patient's blood pressure elevated at 524 systolic on admission; however, subsequent BPs have been acceptable  · CTA head/neck: No evidence of acute vascular territorial infarction, intracranial hemorrhage or mass effect  No stenosis, dissection or occlusion of the carotid or vertebral arteries or major vessels of the [place]  · MRI brain: No evidence of acute infarct, intracranial hemorrhage or mass effect  · Neurology consulted  No additional work up warranted at this time  · Lipid profile: , , , HDL 40   · Continue fish oil  · Diet and exercise  · TSH normal @ 2 110  · Hemoglobin A1c slightly above normal @ 5 7  · Patient cannot take statin as of the moment due to complaints of myalgia  · ECHO: pending at time of discharge  u  · Differential diagnoses include: transient arrhythmia, hypoglycemia, hypertensive urgency, hypoxia secondary to underlying untreated sleep apnea  · Consider out-patient holter monitor  Discuss with primary cardiologist   · Encourage treatment of sleep apnea  Recommend least invasive mask possible (nasal pillows)  · Serial monitoring of blood pressures and blood sugars  Elevated troponin level not due myocardial infarction  Assessment & Plan  · Troponins: 0 09, 0 08  · EKG does not demonstrate any evidence of acute ischemia  · ECHO: pending at time of discharge      Elevated hemoglobin A1c  Assessment & Plan  · Current A1c: 5 7  · Recommend following up with PCP and comply with diet recommendations for impaired fasting glucose (references added to discharge instructions)  Rheumatoid arthritis of multiple sites with negative rheumatoid factor (HCC)  Assessment & Plan  · Continue Plaquenil 200 mg t i d     Essential hypertension  Assessment & Plan  · BPs appear well controlled at this time  · Continue Cozaar 12 5 mg daily  · Will also check echocardiogram x1  (Apparently patient has never had an echocardiogram in the past)  Discharging Physician / Practitioner: Rodney Rosales PA-C  PCP: No primary care provider on file  Admission Date:   Admission Orders (From admission, onward)     Ordered        02/16/21 2110  INPATIENT ADMISSION  Once                   Discharge Date: 02/17/21    Resolved Problems  Date Reviewed: 2/17/2021          Resolved    * (Principal) Dizziness and giddiness 2/17/2021     Resolved by  Rodney Rosales PA-C        Consultations During Hospital Stay:  · Neurology  · PMR    Procedures Performed:   · CTA head/neck: No evidence of acute vascular territorial infarction, intracranial hemorrhage or mass effect  No stenosis, dissection or occlusion of the carotid or vertebral arteries or major vessels of the [place]  · MRI brain: No evidence of acute infarct, intracranial hemorrhage or mass effect  · ECHO: EF 60% with grade 1 diastolic dysfunction  Significant Findings / Test Results:   · None    Incidental Findings:   · Slightly elevated A1c (5 7)  · High cholesterol     Test Results Pending at Discharge (will require follow up): · None     Outpatient Tests Requested:  · Sleep study    Complications:  None    Reason for Admission: Dizziness    Hospital Course:     Floyd Hammond is a 60 y o  male patient who originally presented to the hospital on 2/16/2021 due to an episode of dizziness and difficulty with word finding  He was admitted with stroke work up, which was negative  Neurology did see the patient and cleared for discharge    The patient does have a history of untreated sleep apnea, which may contribute to similar symptoms  I have recommended he attempt treatment with CPAP as an out-patient  He has had a 20 lb weight gain so may require a repeat study  He can discuss this further with his primary care physician  He also has prediabetes as evidenced by A1c  He was instructed to follow a healthy diet and engage in exercise  Please see above list of diagnoses and related plan for additional information  Condition at Discharge: stable     Discharge Day Visit / Exam:   Subjective: The patient is doing well overall and is only reporting feeling fatigued at this time  He denies any dizziness currently  Vitals: Blood Pressure: 150/63 (02/17/21 1415)  Pulse: 80 (02/17/21 1415)  Temperature: 98 5 °F (36 9 °C) (02/17/21 1330)  Temp Source: Oral (02/17/21 1330)  Respirations: 22 (02/17/21 1415)  Height: 5' 11" (180 3 cm) (02/16/21 1648)  Weight - Scale: 113 kg (250 lb) (02/16/21 1648)  SpO2: 94 % (02/17/21 1415)  Exam:   Physical Exam  Constitutional:       General: He is not in acute distress  Appearance: Normal appearance  He is normal weight  HENT:      Head: Normocephalic and atraumatic  Mouth/Throat:      Mouth: Mucous membranes are moist    Eyes:      General: No scleral icterus  Extraocular Movements: Extraocular movements intact  Neck:      Musculoskeletal: Normal range of motion  Cardiovascular:      Rate and Rhythm: Normal rate and regular rhythm  Heart sounds: No murmur  Pulmonary:      Effort: Pulmonary effort is normal  No respiratory distress  Breath sounds: Normal breath sounds  No wheezing, rhonchi or rales  Abdominal:      General: Bowel sounds are normal       Palpations: Abdomen is soft  Tenderness: There is no abdominal tenderness  Musculoskeletal: Normal range of motion  Skin:     General: Skin is warm  Findings: No rash  Neurological:      General: No focal deficit present  Mental Status: He is alert and oriented to person, place, and time  Psychiatric:         Mood and Affect: Mood normal          Behavior: Behavior normal        Discussion with Family: None    Discharge instructions/Information to patient and family:   See after visit summary for information provided to patient and family  Provisions for Follow-Up Care:  See after visit summary for information related to follow-up care and any pertinent home health orders  Disposition:     Home    For Discharges to [facility]:   · Not Applicable to this Patient - Not Applicable to this Patient    Planned Readmission: No     Discharge Statement:  I spent 45 minutes discharging the patient  This time was spent on the day of discharge  I had direct contact with the patient on the day of discharge  Greater than 50% of the total time was spent examining patient, answering all patient questions, arranging and discussing plan of care with patient as well as directly providing post-discharge instructions  Additional time then spent on discharge activities  Discharge Medications:  See after visit summary for reconciled discharge medications provided to patient and family        ** Please Note: This note has been constructed using a voice recognition system **

## 2021-02-17 NOTE — H&P
H&P- Ashli Mendieta 1955, 60 y o  male MRN: [id number]    Unit/Bed#: ED 25 Encounter: [id number]    Primary Care Provider: No primary care provider on file  Date and time admitted to hospital: 2/16/2021  4:43 PM  Primary care physician care of 's [facility] in [place]      * Dizziness and giddiness  Assessment & Plan  Dizziness lasted for 30 seconds  Noted patient's blood pressure elevated at 770 systolic  Also patient with word-finding difficulty for 30 seconds  Although CTA is unremarkable for any brain parenchymal disease; note of normal small posterior circulation  MRI  Stroke protocol  Lipid profile, TSH hemoglobin A1c  Neurology consult, case management, occupational physical therapy consult, PMR consult  Aspirin  Patient cannot take statin as of the moment due to complaints of myalgia  Telemetry  Echocardiogram     Rheumatoid arthritis of multiple sites with negative rheumatoid factor (HCC)  Assessment & Plan  Continue Plaquenil 200 mg t i d  Seasonal allergies  Assessment & Plan  Continue Zyrtec 10 mg daily/Claritin 10 mg daily  Singulair 10 mg daily    Essential hypertension  Assessment & Plan  As per patient, he was recently seen by his own Cardiologist last Friday and no increase in medication was done  Weight gain of approximately 30 lb over the past year due to lack of exercise  May need increase in Cozaar does when he gets discharged  Will continue Cozaar 12 5 mg daily  Add hydralazine 10 mg intravenous Q 4 p r n  if greater 170 mm Hg  Will also check echocardiogram x1  (Apparently patient has never had an echocardiogram in the past)  Gastroesophageal reflux disease without esophagitis  Assessment & Plan  Continue Protonix 40 mg daily  Also on Maalox 15 mL q 6 p r n            VTE Prophylaxis: Enoxaparin (Lovenox)  / sequential compression device   Code Status: Level 1 - Full Code as discussed with patient  POLST: There is no POLST form on file for this patient (pre-hospital)    Anticipated Length of Stay:  Patient will be admitted on an Inpatient basis with an anticipated length of stay of  greater than 2 midnights  Justification for Hospital Stay: Please see detailed plans noted above  Chief Complaint:     Dizziness and word-finding difficulty  History of Present Illness:  Anirudh Jensen is a 60 y o  male who has a past medical history significant for essential hypertension, gastroesophageal reflux disease, hyperlipidemia but not on any statin due myalgia, history of colon polyps, rheumatoid arthritis and sleep apnea  Patient comes in due to concerns of dizziness with word-finding difficulty as he was doing computer work  It lasted approximately 30 seconds and even after that there was some residual word-finding difficulty and somewhat dizziness which slowly resolved on its own  Currently patient is asymptomatic  Patient was noted to be seen by his primary cardiologist last Friday and they did not do any medication changes  Currently here in the emergency room blood pressure has been elevated at 186/86, 187/81  Reportedly patient has gained weight approximately 30 lb with his usual weight at 220 lb currently at 250 lb which she gain over the past year due to lack of exercise  Patient denies any chest pain  No previous history of heart attacks or coronary artery disease, no increase in edema  Patient states that there may have been increase in exercise intolerance with the weight gain  No headache  No cough or cold or fever  No chest pain  Currently, patient is laying flat on bed  He is able to converse in long sentences  There is no note of any conversational dyspnea  No chest pain or distress  Although the intracranial vasculature shows patency and are described to be normal the posterior cerebral arteries are described as:  Normal right and tiny left posterior communicating arteries    No stenosis in the posterior cerebral arteries"  Review of Systems:    Constitutional:  Denies fever or chills   Eyes:  Denies change in visual acuity   HENT:  Denies nasal congestion or sore throat   Respiratory:  Denies cough or shortness of breath but probably with increased dyspnea on exertion  Cardiovascular:  Denies chest pain or edema   GI:  Denies abdominal pain, nausea, vomiting, bloody stools or diarrhea   :  Denies dysuria   Musculoskeletal:  Denies back pain or joint pain   Integument:  Denies rash   Neurologic:  Denies headache, focal weakness or sensory changes   Endocrine:  Denies polyuria or polydipsia   Lymphatic:  Denies swollen glands   Psychiatric:  Denies depression or anxiety     Past Medical and Surgical History:   Past Medical History:   Diagnosis Date    Bundle branch block     Frozen shoulder     right    GERD (gastroesophageal reflux disease)     Hypertension     RA [facility]rheumatoid arthritis) ([facility] [address] )     Seasonal allergies     Sleep apnea      Past Surgical History:   Procedure Laterality Date    APPENDECTOMY      COLONOSCOPY      EGD      JOINT REPLACEMENT      KNEE ARTHROSCOPY      HI COLONOSCOPY FLX DX W/COLLJ SPEC WHEN PFRMD N/A 5/2/2019    Procedure: COLONOSCOPY;  Surgeon: Kay Lennox, MD;  [facility];   Service: Gastroenterology    REPLACEMENT TOTAL KNEE         Meds/Allergies:  (Not in a hospital admission)    albuterol (Ventolin HFA) 90 mcg/act inhaler Inhale 2 puffs every 6 (six) hours as needed for wheezing Michael Hoyt MD Reordered   Ordered as: albuterol (PROVENTIL HFA,VENTOLIN HFA) inhaler 2 puff - 2 puff, Inhalation, Every 6 hours PRN, wheezing, Starting Tue 2/16/21 at 2120 USE WITH SPACER Shake well before use SEAL LEFTOVER/UNUSED MEDICATION IN A ZIP LOCK BAG AND SEND TO PHARMACY    Cetirizine HCl (ZYRTEC ALLERGY) 10 MG CAPS Take 1 capsule by mouth daily Michael Hoyt MD Reordered   Ordered as: loratadine (CLARITIN) tablet 10 mg - 10 mg, Oral, Daily, First dose on Wed 2/17/21 at 0900   cholecalciferol (VITAMIN D3) 1,000 units tablet Take 1,000 Units by mouth daily Nikos Sharp MD Reordered   Ordered as: cholecalciferol (VITAMIN D3) tablet 1,000 Units - 1,000 Units, Oral, Daily, First dose on Wed 2/17/21 at 0900 25 mcg = 1000 units of cholecalciferol (Vitamin D3)    Cinnamon 500 MG capsule Take 1,000 mg by mouth daily Nikos Sharp MD Reordered   Ordered as: Cinnamon 1,000 mg - 1,000 mg, Oral, Daily, First dose on Wed 2/17/21 at 0900   hydroxychloroquine (PLAQUENIL) 200 mg tablet Take 200 mg by mouth 2 (two) times a day with meals Nikos Sharp MD Reordered   Ordered as: hydroxychloroquine (PLAQUENIL) tablet 200 mg - 200 mg, Oral, 2 times daily with meals, First dose on Wed 2/17/21 at [address] with food or milk      losartan (COZAAR) 25 mg tablet Take 12 5 mg by mouth  Nikos Sharp MD Reordered   Ordered as: losartan (COZAAR) tablet 12 5 mg - 12 5 mg, Oral, Daily, First dose on Wed 2/17/21 at 8443 Hold for systolic blood pressure less than (mmHg): 110   MAGNESIUM PO Take 1 tablet by mouth daily Nikos Sharp MD Reordered   Ordered as: magnesium oxide (MAG-OX) tablet 400 mg - 400 mg, Oral, Daily, First dose on Wed 2/17/21 at 0900   montelukast (SINGULAIR) 10 mg tablet Take 10 mg by mouth daily at bedtime Nikos Sharp MD Reordered   Ordered as: montelukast (SINGULAIR) tablet 10 mg - 10 mg, Oral, Daily at bedtime, First dose on Tue 2/16/21 at 2200   Na Sulfate-K Sulfate-Mg Sulf 17 5-3 13-1 6 GM/177ML SOLN As directed Nikos Sharp MD Not Ordered   OLIVE LEAF EXTRACT PO Take 1 capsule by mouth daily Nikos Sharp MD Reordered   Ordered asGirtha Palmira Extract CAPS 500 mg - 500 mg (1 capsule), Oral, Daily, First dose on Wed 2/17/21 at 0900   pantoprazole (PROTONIX) 40 mg tablet Take 40 mg by mouth daily Nikos Sharp MD Reordered   Ordered as: pantoprazole (PROTONIX) EC tablet 40 mg - 40 mg, Oral, Daily, First dose on Wed 2/17/21 at 0900 Swallow whole; do not crush, chew or split  LOOK ALIKE SOUND ALIKE MED    potassium chloride (K-DUR,KLOR-CON) 10 mEq tablet Take 10 mEq by mouth daily Morena Villar MD Reordered   Ordered as: potassium chloride (K-DUR,KLOR-CON) CR tablet 10 mEq - 10 mEq, Oral, Daily, First dose on 21 at 0900 Swallow whole; do not crush or chew  Tablet may be split in half to facilitate swallowing  Allergies: Allergies   Allergen Reactions    Statins Myalgia     History:  Marital Status: Single   Occupation:    Patient Pre-hospital Living Situation:  Lives at home  Patient Pre-hospital Level of Mobility:  Ambulatory  Patient Pre-hospital Diet Restrictions:  Regular diet  Substance Use History:   Social History     Substance and Sexual Activity   Alcohol Use Yes    Frequency: 2-3 times a week     Social History     Tobacco Use   Smoking Status Former Smoker    Packs/day: 1 50    Types: Cigarettes    Quit date: 12    Years since quittin    Smokeless Tobacco Never Used     Social History     Substance and Sexual Activity   Drug Use Never       Family History:  History reviewed  No pertinent family history  Physical Exam:     Vitals:   Blood Pressure: (!) 187/81 (21)  Pulse: 68 (21)  Temperature: 98 5 °F (36 9 °C) (21)  Temp Source: Oral (21)  Respirations: 19 (21)  Height: 5' 11" (180 3 cm) (21)  Weight - Scale: 113 kg (250 lb) (21)  SpO2: 97 % (21)    Constitutional:  Well developed, well nourished, obese, BMI 34 87 no acute distress, non-toxic appearance   Eyes:  PERRL, conjunctiva normal   HENT:  Atraumatic, external ears normal, nose normal, oropharynx moist, no pharyngeal exudates   Neck- normal range of motion, no tenderness, supple   Respiratory:  No respiratory distress, normal breath sounds, no rales, no wheezing   Cardiovascular:  Normal rate, normal rhythm, no murmurs, no gallops, no rubs   GI:  Soft, nondistended, normal bowel sounds, nontender, no organomegaly, no mass, no rebound, no guarding   :  No costovertebral angle tenderness   Musculoskeletal:  No edema, no tenderness, no deformities  Back- no tenderness  Integument:  Well hydrated, no rash   Lymphatic:  No lymphadenopathy noted   Neurologic:  Alert &awake, communicative, CN 2-12 normal, normal motor function, normal sensory function, no focal deficits noted   Psychiatric:  Speech and behavior appropriate       Lab Results: I have personally reviewed pertinent reports  Results from last 7 days   Lab Units 02/16/21  1742   WBC Thousand/uL 7 15   HEMOGLOBIN g/dL 14 1   HEMATOCRIT % 44 1   PLATELETS Thousands/uL 298   NEUTROS PCT % 62   LYMPHS PCT % 24   MONOS PCT % 10   EOS PCT % 2     Results from last 7 days   Lab Units 02/16/21  1742   POTASSIUM mmol/L 4 3   CHLORIDE mmol/L 105   CO2 mmol/L 31   BUN mg/dL 21   CREATININE mg/dL 1 00   CALCIUM mg/dL 9 3   ALK PHOS U/L 65   ALT U/L 33   AST U/L 18               Imaging: I have personally reviewed pertinent reports  Cta Head And Neck With And Without Contrast    Result Date: 2/16/2021  Narrative: CTA NECK AND BRAIN WITH AND WITHOUT CONTRAST INDICATION: Dizziness  COMPARISON:   None  TECHNIQUE:  Routine CT imaging of the Brain without contrast   Post contrast imaging was performed after administration of iodinated contrast through the neck and brain  Post contrast axial 0 625 mm images timed to opacify the arterial system  3D rendering was performed on an independent workstation  MIP reconstructions performed  Coronal reconstructions were performed of the noncontrast portion of the brain  Radiation dose length product (DLP) for this visit:  1542 24 mGy-cm   This examination, like all CT scans performed in the [facility], was performed utilizing techniques to minimize radiation dose exposure, including the use of iterative reconstruction and automated exposure control     IV Contrast:  90 mL of iohexol (OMNIPAQUE)  IMAGE QUALITY:   Diagnostic FINDINGS: NONCONTRAST BRAIN PARENCHYMA:  Periventricular and subcortical hypoattenuating foci consistent with microangiopathic disease  No acute intracranial hemorrhage or mass effect  VENTRICLES AND EXTRA-AXIAL SPACES:  No hydrocephalus or extra-axial collection  VISUALIZED ORBITS AND PARANASAL SINUSES:  Intact globes and orbits  Clear paranasal sinuses  CERVICAL VASCULATURE AORTIC ARCH AND GREAT VESSELS:  Left vertebral artery or obstructive from the aortic arch  No stenosis in the subclavian arteries  RIGHT VERTEBRAL ARTERY CERVICAL SEGMENT:  Normal origin  The vessel is normal in caliber throughout the neck  LEFT VERTEBRAL ARTERY CERVICAL SEGMENT: Nondominant vessel arising directly from the aortic arch  No stenosis or dissection  RIGHT EXTRACRANIAL CAROTID SEGMENT:  Normal caliber common carotid artery  Normal bifurcation and cervical internal carotid artery  No stenosis or dissection  LEFT EXTRACRANIAL CAROTID SEGMENT:  Normal caliber common carotid artery  Normal bifurcation and cervical internal carotid artery  No stenosis or dissection  NASCET criteria was used to determine the degree of internal carotid artery diameter stenosis  INTRACRANIAL VASCULATURE INTERNAL CAROTID ARTERIES:  Normal enhancement of the intracranial portions of the internal carotid arteries  Normal ophthalmic artery origins  Normal ICA terminus  ANTERIOR CIRCULATION:  Symmetric A1 segments and anterior cerebral arteries with normal enhancement  Normal anterior communicating artery  MIDDLE CEREBRAL ARTERY CIRCULATION:  M1 segment and middle cerebral artery branches demonstrate normal enhancement bilaterally  DISTAL VERTEBRAL ARTERIES:  Normal distal vertebral arteries  Posterior inferior cerebellar arteries are patent  BASILAR ARTERY:  Basilar artery is normal in caliber  Patent superior cerebellar arteries   POSTERIOR CEREBRAL ARTERIES: Normal right and tiny left posterior communicating arteries  No stenosis in the posterior cerebral arteries DURAL VENOUS SINUSES:  Patent  NON VASCULAR ANATOMY BONY STRUCTURES:  No lytic or blastic lesion  SOFT TISSUES OF THE NECK:  No mass or lymphadenopathy  THORACIC INLET:  Clear lung apices  Impression: 1  No evidence of acute vascular territorial infarction, intracranial hemorrhage or mass effect  2   No stenosis, dissection or occlusion of the carotid or vertebral arteries or major vessels of the [place]  Workstation performed: [id number]         ** Please Note: Dragon 360 Dictation voice to text software was used in the creation of this document   **

## 2021-02-17 NOTE — ASSESSMENT & PLAN NOTE
· Current A1c: 5 7  · Recommend following up with PCP and comply with diet recommendations for impaired fasting glucose (references added to discharge instructions)

## 2021-02-17 NOTE — CONSULTS
Consultation - Neurology   Rhonda Wheat 60 y o  male MRN: [id number]  Unit/Bed#: ED 25 Encounter: [id number]      Assessment/Plan   * Dizziness and giddiness  Assessment & Plan  3 60year-old with HTN, HLD, and rheumatoid arthritis who presented on 2/16 for acute onset lightheadedness that improved within 15 seconds while patient was sitting at a computer at home  No reported associated symptoms  BP on arrival was 186/86  CTA head and neck showed no stenosis, dissection, or occlusion  MRI brain was negative for acute infarction  Unclear etiology, do not suspect TIA at this time  Suspect cerebral hypoperfusion in the setting of possible transient cardiac arrhythmia or hypotension  Plan:  - Echocardiogram pending  - No indication for ASA 325mg from a neurological standpoint   - Can consider cardiac event monitoring if episode recurs  - No additional neurological testing recommended at this time  - Patient is reportedly intolerant to statin medications (myalgias)  - PT/OT/ST   - Neuro checks  - Stroke education  - Telemetry  - Notify Neurology with any changes in neuro examination  - Medical management and supportive care as per primary team  - No further inpatient Neurological recommendations, please call with questions/concerns  Results:  1  MRI brain: No evidence of acute infarct, intracranial hemorrhage or mass effect  2  CTA head and neck:  No evidence of acute vascular territorial infarction, intracranial hemorrhage or mass effect  No stenosis, dissection or occlusion of the carotid or vertebral arteries or major vessels of the [place]  3  Lipid panel: LDL of 137  4  HgbA1c: 5 7  5  TSH: 2 110    Rheumatoid arthritis of multiple sites with negative rheumatoid factor ([facility] [address] )  Assessment & Plan  - Continue home Plaquenil    Essential hypertension  Assessment & Plan  - /86 on arrival      Rhonda Wheat will not need outpatient follow up with Neurology   He will not require outpatient neurological testing  History of Present Illness     Reason for Consult / Principal Problem: Near syncope  Hx and PE limited by: N/A  HPI: Thiago Landry is a 60 y o  male with HTN, GERD, HLD (reportedly intolerant of statins), rheumatoid arthritis, and sleep apnea who presented to [facility] ED on 2/16 for acute onset lightheadedness  It was reported that patient was sitting at a computer at home and developed acute onset lightheadedness  Patient states that he was in a normal state prior to the episode and ate breakfast  He believes that he did not eat lunch yet  He reports that he was sitting at his computer and all of a sudden he felt like he was going to pass out  He states that it scared him and has never happened before  He reports that the episode lasted for about 15 seconds  Patient was able to get up from the chair and take ASA and drink water  He sat down at a different chair and made phone calls  He states that the symptoms resolved and he felt okay to drive himself to the ED  Patient denied nausea, headache, diaphoresis, chest pain, shortness of breath, palpitations, changes in speech, weakness, or numbness  Upon arrival to the ED, BP of 186/86  Patient had no reported symptoms in the ED  Patient also reported 30 pound weight gain over the past year due to lack of exercise  CTA head and neck showed no stenosis, dissection, or occlusion  At baseline, patient is not on any antiplatelet agents or anticoagulation  Today, patient reports no recurrent symptoms while in the ED  Patient states that he takes his blood pressure at home and his BP is around 156R systolic  He states that he was taken off of his blood pressure medications at some point, but was put back on them within the past year  He states that he is intolerant of statins and has tried 3 different statins that have caused myalgias  Patient reports that he saw his Cardiologist on Friday, 2/12      Inpatient consult to Neurology  Consult performed by: Korin Luque PA-C  Consult ordered by: Dina Roberts MD        ROS:  A 12 point ROS was completed  Other than the above mentioned complaints in the HPI, all remaining systems were negative  Historical Information   Past Medical History:   Diagnosis Date    Bundle branch block     Frozen shoulder     right    GERD (gastroesophageal reflux disease)     Hypertension     RA [facility]he[facility]oid arthritis) ([facility] [address] )     Seasonal allergies     Sleep apnea      Past Surgical History:   Procedure Laterality Date    APPENDECTOMY      COLONOSCOPY      EGD      JOINT REPLACEMENT      KNEE ARTHROSCOPY      NC COLONOSCOPY FLX DX W/COLLJ SPEC WHEN PFRMD N/A 2019    Procedure: COLONOSCOPY;  Surgeon: Marisel Greer MD;  Location: [facility]; Service: Gastroenterology    REPLACEMENT TOTAL KNEE       Social History   Social History     Substance and Sexual Activity   Alcohol Use Yes    Frequency: 2-3 times a week     Social History     Substance and Sexual Activity   Drug Use Never     E-Cigarette/Vaping    E-Cigarette Use Never User      E-Cigarette/Vaping Substances     Social History     Tobacco Use   Smoking Status Former Smoker    Packs/day: 1 50    Types: Cigarettes    Quit date: 12    Years since quittin 1   Smokeless Tobacco Never Used     Family History: History reviewed  No pertinent family history  Review of previous medical records was completed       Meds/Allergies   current meds:   Current Facility-Administered Medications   Medication Dose Route Frequency    acetaminophen (TYLENOL) tablet 650 mg  650 mg Oral Q6H PRN    albuterol (PROVENTIL HFA,VENTOLIN HFA) inhaler 2 puff  2 puff Inhalation Q6H PRN    aluminum-magnesium hydroxide-simethicone (MYLANTA) oral suspension 15 mL  15 mL Oral Q6H PRN    aspirin tablet 325 mg  325 mg Oral Daily    cholecalciferol (VITAMIN D3) tablet 1,000 Units  1,000 Units Oral Daily    docusate sodium (COLACE) capsule 100 mg  100 mg Oral BID PRN  enoxaparin (LOVENOX) subcutaneous injection 40 mg  40 mg Subcutaneous Daily    hydrALAZINE (APRESOLINE) injection 10 mg  10 mg Intravenous Q4H PRN    hydroxychloroquine (PLAQUENIL) tablet 200 mg  200 mg Oral BID With Meals    loratadine (CLARITIN) tablet 10 mg  10 mg Oral Daily    losartan (COZAAR) tablet 12 5 mg  12 5 mg Oral Daily    magnesium oxide (MAG-OX) tablet 400 mg  400 mg Oral Daily    montelukast (SINGULAIR) tablet 10 mg  10 mg Oral QAM    ondansetron (ZOFRAN) injection 4 mg  4 mg Intravenous Q6H PRN    pantoprazole (PROTONIX) EC tablet 40 mg  40 mg Oral Daily    potassium chloride (K-DUR,KLOR-CON) CR tablet 10 mEq  10 mEq Oral Daily    and PTA meds:   Prior to Admission Medications   Prescriptions Last Dose Informant Patient Reported? Taking?    Cetirizine HCl (ZYRTEC ALLERGY) 10 MG CAPS  Self Yes No   Sig: Take 1 capsule by mouth daily   Cinnamon 500 MG capsule  Self Yes No   Sig: Take 1,000 mg by mouth daily   MAGNESIUM PO  Self Yes No   Sig: Take 1 tablet by mouth daily   Na Sulfate-K Sulfate-Mg Sulf 17 5-3 13-1 6 GM/177ML SOLN   No No   Sig: As directed   OLIVE LEAF EXTRACT PO  Self Yes No   Sig: Take 1 capsule by mouth daily   albuterol (Ventolin HFA) 90 mcg/act inhaler   No No   Sig: Inhale 2 puffs every 6 (six) hours as needed for wheezing   cholecalciferol (VITAMIN D3) 1,000 units tablet  Self Yes No   Sig: Take 1,000 Units by mouth daily   hydroxychloroquine (PLAQUENIL) 200 mg tablet  Self Yes No   Sig: Take 200 mg by mouth 2 (two) times a day with meals   losartan (COZAAR) 25 mg tablet  Self Yes No   Sig: Take 12 5 mg by mouth    montelukast (SINGULAIR) 10 mg tablet   Yes No   Sig: Take 10 mg by mouth daily at bedtime   pantoprazole (PROTONIX) 40 mg tablet   Yes No   Sig: Take 40 mg by mouth daily   potassium chloride (K-DUR,KLOR-CON) 10 mEq tablet  Self Yes No   Sig: Take 10 mEq by mouth daily      Facility-Administered Medications: None       Allergies   Allergen Reactions    Statins Myalgia       Objective   Vitals:Blood pressure 119/76, pulse 72, temperature 98 5 °F (36 9 °C), temperature source Oral, resp  rate 21, height 5' 11" (1 803 m), weight 113 kg (250 lb), SpO2 96 %  ,Body mass index is 34 87 kg/m²  No intake or output data in the 24 hours ending 02/17/21 0835    Invasive Devices: Invasive Devices     Peripheral Intravenous Line            Peripheral IV 02/16/21 Right Antecubital less than 1 day              Physical Exam  Constitutional:       Appearance: He is not ill-appearing or diaphoretic  Comments: Male resting comfortably in bed   HENT:      Head: Normocephalic and atraumatic  Right Ear: External ear normal       Left Ear: External ear normal       Nose: Nose normal    Eyes:      General:         Right eye: No discharge  Left eye: No discharge  Extraocular Movements: Extraocular movements intact and EOM normal    Pulmonary:      Effort: No respiratory distress  Skin:     General: Skin is warm and dry  Neurological:      Coordination: Finger-Nose-Finger Test normal       Deep Tendon Reflexes: Strength normal        Neurologic Exam     Mental Status   Patient was alert and oriented to person, place, and date  Patient followed all commands appropriately     Cranial Nerves     CN II   Visual fields full to confrontation  CN III, IV, VI   Extraocular motions are normal    Conjugate gaze: present    CN VII   Facial expression full, symmetric  CN VIII   Hearing: intact    CN XII   CN XII normal      Motor Exam     Strength   Strength 5/5 throughout  Sensory Exam   Light touch normal    Vibration normal    Temperature sensation intact X 4 extremities     Gait, Coordination, and Reflexes     Coordination   Finger to nose coordination: normal    Tremor   Resting tremor: absent  Diminished reflexes throughout      Lab Results: I have personally reviewed pertinent reports  Imaging Studies: I have personally reviewed pertinent reports  and I have personally reviewed pertinent films in PACS  EKG, Pathology, and Other Studies: I have personally reviewed pertinent reports      VTE Prophylaxis: Sequential compression device (Venodyne)  and Enoxaparin (Lovenox)    Code Status: Level 1 - Full Code  Advance Directive and Living Will:      Power of :    POLST:

## 2021-02-18 NOTE — ED PROVIDER NOTES
History  Chief Complaint   Patient presents with    Dizziness     pt reports was working at his computer today when he became dizzy that was "really quick" and self limiting  denies CP/headache  reports saw cardiology recently for new HTN  reports he is mildly dizzy now  62year old male history of hypertension presents with dizziness  States he was at the computer today when he felt acute dizziness  Has difficulty describing sensation but states he felt like he might pass out  States it was sudden and not precipitated by anything of which he is aware  Does not think he was making any head movements at the time, nor any  associated symptoms  Denies chest pain, abdominal pain, shortness of breath, palpitations, focal deficits, cough, fever, chills, nausea, vomiting, diarrhea, bloody or dark stool, urinary symptoms, myalgias  Does endorse vague sensation of hearing his pulse, but denies ringing in his ears  When asked if he feels like himself mentally, he states for the past couple of months he feels he has had significant word finding problems; generally knows what he wants to express but either can't find the right words versus cannot get the words out  Has seen ENT for "bony ingrowths" of ear canal             Prior to Admission Medications   Prescriptions Last Dose Informant Patient Reported? Taking?    Cetirizine HCl (ZYRTEC ALLERGY) 10 MG CAPS  Self Yes No   Sig: Take 1 capsule by mouth daily   Cinnamon 500 MG capsule  Self Yes No   Sig: Take 1,000 mg by mouth daily   MAGNESIUM PO  Self Yes No   Sig: Take 1 tablet by mouth daily   Na Sulfate-K Sulfate-Mg Sulf 17 5-3 13-1 6 GM/177ML SOLN   No No   Sig: As directed   OLIVE LEAF EXTRACT PO  Self Yes No   Sig: Take 1 capsule by mouth daily   albuterol (Ventolin HFA) 90 mcg/act inhaler   No No   Sig: Inhale 2 puffs every 6 (six) hours as needed for wheezing   cholecalciferol (VITAMIN D3) 1,000 units tablet  Self Yes No   Sig: Take 1,000 Units by mouth daily hydroxychloroquine (PLAQUENIL) 200 mg tablet  Self Yes No   Sig: Take 200 mg by mouth 2 (two) times a day with meals   losartan (COZAAR) 25 mg tablet  Self Yes No   Sig: Take 12 5 mg by mouth    montelukast (SINGULAIR) 10 mg tablet   Yes No   Sig: Take 10 mg by mouth daily at bedtime   pantoprazole (PROTONIX) 40 mg tablet   Yes No   Sig: Take 40 mg by mouth daily   potassium chloride (K-DUR,KLOR-CON) 10 mEq tablet  Self Yes No   Sig: Take 10 mEq by mouth daily      Facility-Administered Medications: None       Past Medical History:   Diagnosis Date    Bundle branch block     Frozen shoulder     right    GERD (gastroesophageal reflux disease)     Hypertension     RA [facility]he[facility]oid arthritis) ([facility] [address] )     Seasonal allergies     Sleep apnea        Past Surgical History:   Procedure Laterality Date    APPENDECTOMY      COLONOSCOPY      EGD      JOINT REPLACEMENT      KNEE ARTHROSCOPY      AL COLONOSCOPY FLX DX W/COLLJ SPEC WHEN PFRMD N/A 2019    Procedure: COLONOSCOPY;  Surgeon: Saundra Peck MD;  [facility]: [facility]; Service: Gastroenterology    REPLACEMENT TOTAL KNEE         History reviewed  No pertinent family history  I have reviewed and agree with the history as documented  E-Cigarette/Vaping    E-Cigarette Use Never User      E-Cigarette/Vaping Substances     Social History     Tobacco Use    Smoking status: Former Smoker     Packs/day: 1 50     Types: Cigarettes     Quit date:      Years since quittin 1    Smokeless tobacco: Never Used   Substance Use Topics    Alcohol use: Yes     Frequency: 2-3 times a week    Drug use: Never        Review of Systems   Constitutional: Negative for chills and fever  HENT: Negative for ear pain, hearing loss, sinus pain, sore throat and tinnitus  Eyes: Negative for pain  Respiratory: Negative for shortness of breath  Cardiovascular: Negative for chest pain     Gastrointestinal: Negative for abdominal pain, diarrhea, nausea and vomiting  Genitourinary: Negative for difficulty urinating, dysuria and flank pain  Musculoskeletal: Negative for back pain and neck pain  Neurological: Positive for dizziness, speech difficulty and light-headedness  Negative for syncope, facial asymmetry, weakness, numbness and headaches  All other systems reviewed and are negative  Physical Exam  ED Triage Vitals   Temperature Pulse Respirations Blood Pressure SpO2   02/16/21 1654 02/16/21 1648 02/16/21 1648 02/16/21 1648 02/16/21 1648   98 5 °F (36 9 °C) 78 18 (!) 186/86 98 %      Temp Source Heart Rate Source Patient Position - Orthostatic VS BP Location FiO2 (%)   02/16/21 1654 02/16/21 1830 02/16/21 1830 02/16/21 1830 --   Oral Monitor Sitting Right arm       Pain Score       02/16/21 1648       No Pain             Orthostatic Vital Signs  Vitals:    02/17/21 0800 02/17/21 1300 02/17/21 1330 02/17/21 1415   BP: 119/76 109/59  150/63   Pulse: 72 56 70 80   Patient Position - Orthostatic VS: Lying          Physical Exam  Vitals signs and nursing note reviewed  Constitutional:       General: He is not in acute distress  Appearance: Normal appearance  He is well-developed  He is obese  He is not ill-appearing, toxic-appearing or diaphoretic  HENT:      Head: Normocephalic  Ears:      Comments: Significant narrowing of ear canal b/l without erythema  TM visualized but unable to characterize beyond appearing pearly gray     Nose: Nose normal  No congestion or rhinorrhea  Mouth/Throat:      Mouth: Mucous membranes are moist    Eyes:      General: No scleral icterus  Right eye: No discharge  Left eye: No discharge  Extraocular Movements: Extraocular movements intact  Conjunctiva/sclera: Conjunctivae normal       Pupils: Pupils are equal, round, and reactive to light  Neck:      Musculoskeletal: Normal range of motion and neck supple  No neck rigidity  Cardiovascular:      Rate and Rhythm: Normal rate  Heart sounds: Normal heart sounds  Pulmonary:      Effort: Pulmonary effort is normal  No respiratory distress  Breath sounds: No stridor  Abdominal:      General: There is no distension  Palpations: Abdomen is soft  Tenderness: There is no abdominal tenderness  There is no guarding or rebound  Skin:     General: Skin is warm and dry  Capillary Refill: Capillary refill takes less than 2 seconds  Neurological:      Mental Status: He is alert and oriented to person, place, and time  Cranial Nerves: No cranial nerve deficit  Comments: Pt appears to have some degree of expressive aphagia   Psychiatric:         Mood and Affect: Mood normal          Behavior: Behavior normal       Comments: Pleasant, cooperative           ED Medications  Medications   aspirin tablet 325 mg (325 mg Oral Given 2/16/21 1830)   aspirin chewable tablet 324 mg (324 mg Oral Given 2/16/21 1838)   iohexol (OMNIPAQUE) 350 MG/ML injection (SINGLE-DOSE) 100 mL (90 mL Intravenous Given 2/16/21 1843)   LORazepam (ATIVAN) injection 2 mg (2 mg Intravenous Given 2/16/21 2356)   perflutren lipid microsphere (DEFINITY) injection (0 6 mL/min Intravenous Given 2/17/21 1623)       Diagnostic Studies  Results Reviewed     Procedure Component Value Units Date/Time    TSH, 3rd generation [[id number]]  (Normal) Collected: 02/17/21 0423    Lab Status: Final result Specimen: Blood from Arm, Right Updated: 02/17/21 0510     TSH 3RD GENERATON 2 110 uIU/mL     Narrative:      Patients undergoing fluorescein dye angiography may retain small amounts of fluorescein in the body for 48-72 hours post procedure  Samples containing fluorescein can produce falsely depressed TSH values  If the patient had this procedure,a specimen should be resubmitted post fluorescein clearance        Comprehensive metabolic panel [[id number]]  (Abnormal) Collected: 02/17/21 0423    Lab Status: Final result Specimen: Blood from Arm, Right Updated: 02/17/21 0510     Sodium 143 mmol/L      Potassium 4 2 mmol/L      Chloride 108 mmol/L      CO2 32 mmol/L      ANION GAP 3 mmol/L      BUN 22 mg/dL      Creatinine 1 18 mg/dL      Glucose 98 mg/dL      Calcium 9 1 mg/dL      Corrected Calcium 9 7 mg/dL      AST 21 U/L      ALT 29 U/L      Alkaline Phosphatase 57 U/L      Total Protein 6 7 g/dL      Albumin 3 2 g/dL      Total Bilirubin 0 53 mg/dL      eGFR 65 ml/min/1 73sq m     Narrative:      [place] guidelines for Chronic Kidney Disease (CKD):     Stage 1 with normal or high GFR (GFR > 90 mL/min/1 73 square meters)    Stage 2 Mild CKD (GFR = 60-89 mL/min/1 73 square meters)    Stage 3A Moderate CKD (GFR = 45-59 mL/min/1 73 square meters)    Stage 3B Moderate CKD (GFR = 30-44 mL/min/1 73 square meters)    Stage 4 Severe CKD (GFR = 15-29 mL/min/1 73 square meters)    Stage 5 End Stage CKD (GFR <15 mL/min/1 73 square meters)  Note: GFR calculation is accurate only with a steady state creatinine    Lipid Panel with Direct LDL reflex [[id number]]  (Abnormal) Collected: 02/17/21 0423    Lab Status: Final result Specimen: Blood from Arm, Right Updated: 02/17/21 0504     Cholesterol 229 mg/dL      Triglycerides 260 mg/dL      HDL, Direct 40 mg/dL      LDL Calculated 137 mg/dL     Magnesium [[id number]]  (Normal) Collected: 02/17/21 0423    Lab Status: Final result Specimen: Blood from Arm, Right Updated: 02/17/21 0504     Magnesium 2 0 mg/dL     Phosphorus [[id number]]  (Normal) Collected: 02/17/21 0423    Lab Status: Final result Specimen: Blood from Arm, Right Updated: 02/17/21 0504     Phosphorus 3 7 mg/dL     Hemoglobin A1c w/EAG Estimation [[id number]]  (Abnormal) Collected: 02/17/21 0423    Lab Status: Final result Specimen: Blood from Arm, Right Updated: 02/17/21 0455     Hemoglobin A1C 5 7 %       mg/dl     CBC and differential [[id number]] Collected: 02/17/21 0423    Lab Status: Final result Specimen: Blood from Arm, Right Updated: 02/17/21 0440     WBC 7 81 Thousand/uL      RBC 4 37 Million/uL      Hemoglobin 13 5 g/dL      Hematocrit 41 7 %      MCV 95 fL      MCH 30 9 pg      MCHC 32 4 g/dL      RDW 13 2 %      MPV 9 4 fL      Platelets 730 Thousands/uL      nRBC 0 /100 WBCs      Neutrophils Relative 62 %      Immat GRANS % 1 %      Lymphocytes Relative 23 %      Monocytes Relative 11 %      Eosinophils Relative 2 %      Basophils Relative 1 %      Neutrophils Absolute 4 87 Thousands/µL      Immature Grans Absolute 0 08 Thousand/uL      Lymphocytes Absolute 1 80 Thousands/µL      Monocytes Absolute 0 88 Thousand/µL      Eosinophils Absolute 0 13 Thousand/µL      Basophils Absolute 0 05 Thousands/µL     UA (URINE) with reflex to Scope [[id number]] Collected: 02/16/21 2127    Lab Status: Final result Specimen: Urine, Clean Catch Updated: 02/16/21 2145     Color, UA Yellow     Clarity, UA Cloudy     Specific Gravity, UA 1 012     pH, UA 6 0     Leukocytes, UA Negative     Nitrite, UA Negative     Protein, UA Negative mg/dl      Glucose, UA Negative mg/dl      Ketones, UA Negative mg/dl      Urobilinogen, UA 0 2 E U /dl      Bilirubin, UA Negative     Blood, UA Negative    Troponin I [[id number]]  (Abnormal) Collected: 02/16/21 2011    Lab Status: Final result Specimen: Blood from Arm, Right Updated: 02/16/21 2040     Troponin I 0 08 ng/mL     Comprehensive metabolic panel [[id number]] Collected: 02/16/21 1742    Lab Status: Final result Specimen: Blood from Arm, Right Updated: 02/16/21 1814     Sodium 140 mmol/L      Potassium 4 3 mmol/L      Chloride 105 mmol/L      CO2 31 mmol/L      ANION GAP 4 mmol/L      BUN 21 mg/dL      Creatinine 1 00 mg/dL      Glucose 114 mg/dL      Calcium 9 3 mg/dL      AST 18 U/L      ALT 33 U/L      Alkaline Phosphatase 65 U/L      Total Protein 7 3 g/dL      Albumin 3 6 g/dL      Total Bilirubin 0 35 mg/dL      eGFR 79 ml/min/1 73sq m     Narrative:      [place] guidelines for Chronic Kidney Disease (CKD):     Stage 1 with normal or high GFR (GFR > 90 mL/min/1 73 square meters)    Stage 2 Mild CKD (GFR = 60-89 mL/min/1 73 square meters)    Stage 3A Moderate CKD (GFR = 45-59 mL/min/1 73 square meters)    Stage 3B Moderate CKD (GFR = 30-44 mL/min/1 73 square meters)    Stage 4 Severe CKD (GFR = 15-29 mL/min/1 73 square meters)    Stage 5 End Stage CKD (GFR <15 mL/min/1 73 square meters)  Note: GFR calculation is accurate only with a steady state creatinine    Troponin I [[id number]]  (Abnormal) Collected: 02/16/21 1742    Lab Status: Final result Specimen: Blood from Arm, Right Updated: 02/16/21 1814     Troponin I 0 09 ng/mL     CBC and differential [[id number]] Collected: 02/16/21 1742    Lab Status: Final result Specimen: Blood from Arm, Right Updated: 02/16/21 1805     WBC 7 15 Thousand/uL      RBC 4 62 Million/uL      Hemoglobin 14 1 g/dL      Hematocrit 44 1 %      MCV 96 fL      MCH 30 5 pg      MCHC 32 0 g/dL      RDW 13 1 %      MPV 9 4 fL      Platelets 767 Thousands/uL      nRBC 0 /100 WBCs      Neutrophils Relative 62 %      Immat GRANS % 1 %      Lymphocytes Relative 24 %      Monocytes Relative 10 %      Eosinophils Relative 2 %      Basophils Relative 1 %      Neutrophils Absolute 4 49 Thousands/µL      Immature Grans Absolute 0 09 Thousand/uL      Lymphocytes Absolute 1 68 Thousands/µL      Monocytes Absolute 0 73 Thousand/µL      Eosinophils Absolute 0 11 Thousand/µL      Basophils Absolute 0 05 Thousands/µL                  MRI brain wo contrast   Final Result by Georgiana Gilford, MD (02/17 0104)      No evidence of acute infarct, intracranial hemorrhage or mass effect  Workstation performed: [id number]         CTA head and neck with and without contrast   Final Result by Georgiana Gilford, MD (02/16 1945)         1  No evidence of acute vascular territorial infarction, intracranial hemorrhage or mass effect     2   No stenosis, dissection or occlusion of the carotid or vertebral arteries or major vessels of the [place]  Workstation performed: [id number]               Procedures  Procedures      ED Course                             SBIRT 22yo+      Most Recent Value   SBIRT (22 yo +)   In order to provide better care to our patients, we are screening all of our patients for alcohol and drug use  Would it be okay to ask you these screening questions? No Filed at: 02/17/2021 1523                MDM  Number of Diagnoses or Management Options  Near syncope:   Diagnosis management comments: Suspect dysrhythmia vs TIAs  With apparent recent URI, inner ear dysfunction possible  CTA head and neck, plan to admit  Elevated troponin noted  324 asa administered      Disposition  Final diagnoses:   Near syncope     Time reflects when diagnosis was documented in both MDM as applicable and the Disposition within this note     Time User Action Codes Description Comment    2/16/2021  9:04 PM Irineo Anderson Add [R55] Near syncope       ED Disposition     ED Disposition Condition Date/Time Comment    Discharge  Wed Feb 17, 2021  4:03 PM Carmen Vegas discharge to home/self care            Follow-up Information     Follow up With Specialties Details Why Contact Info    [address] [facility] Follow up in 1 week(s)  [address]             Discharge Medication List as of 2/17/2021  3:51 PM      CONTINUE these medications which have NOT CHANGED    Details   albuterol (Ventolin HFA) 90 mcg/act inhaler Inhale 2 puffs every 6 (six) hours as needed for wheezing, Starting Thu 1/21/2021, Normal      Cetirizine HCl (ZYRTEC ALLERGY) 10 MG CAPS Take 1 capsule by mouth daily, Historical Med      cholecalciferol (VITAMIN D3) 1,000 units tablet Take 1,000 Units by mouth daily, Historical Med      Cinnamon 500 MG capsule Take 1,000 mg by mouth daily, Historical Med      hydroxychloroquine (PLAQUENIL) 200 mg tablet Take 200 mg by mouth 2 (two) times a day with meals, Historical Med      losartan (COZAAR) 25 mg tablet Take 12 5 mg by mouth , Historical Med      MAGNESIUM PO Take 1 tablet by mouth daily, Historical Med      montelukast (SINGULAIR) 10 mg tablet Take 10 mg by mouth daily at bedtime, Historical Med      Na Sulfate-K Sulfate-Mg Sulf 17 5-3 13-1 6 GM/177ML SOLN As directed, Sample      OLIVE LEAF EXTRACT PO Take 1 capsule by mouth daily, Historical Med      pantoprazole (PROTONIX) 40 mg tablet Take 40 mg by mouth daily, Historical Med      potassium chloride (K-DUR,KLOR-CON) 10 mEq tablet Take 10 mEq by mouth daily, Historical Med           Outpatient Discharge Orders   Discharge Diet     Activity as tolerated       PDMP Review     None           ED Provider  Attending physically available and evaluated Joe Jenkins I managed the patient along with the ED Attending      Electronically Signed by         Harley Pinedo MD  02/17/21 5569

## 2021-03-10 DIAGNOSIS — Z23 ENCOUNTER FOR IMMUNIZATION: ICD-10-CM

## 2021-06-12 ENCOUNTER — TELEPHONE (OUTPATIENT)
Dept: URGENT CARE | Facility: MEDICAL CENTER | Age: 66
End: 2021-06-12

## 2021-06-12 ENCOUNTER — OFFICE VISIT (OUTPATIENT)
Dept: URGENT CARE | Facility: MEDICAL CENTER | Age: 66
End: 2021-06-12
Payer: COMMERCIAL

## 2021-06-12 VITALS — TEMPERATURE: 100.8 F | OXYGEN SATURATION: 97 % | RESPIRATION RATE: 18 BRPM | HEART RATE: 107 BPM

## 2021-06-12 DIAGNOSIS — Z20.822 ENCOUNTER FOR LABORATORY TESTING FOR COVID-19 VIRUS: ICD-10-CM

## 2021-06-12 DIAGNOSIS — Z02.1 PRE-EMPLOYMENT EXAMINATION: Primary | ICD-10-CM

## 2021-06-12 DIAGNOSIS — R05.9 COUGH: ICD-10-CM

## 2021-06-12 PROCEDURE — 99213 OFFICE O/P EST LOW 20 MIN: CPT | Performed by: PHYSICIAN ASSISTANT

## 2021-06-12 PROCEDURE — U0003 INFECTIOUS AGENT DETECTION BY NUCLEIC ACID (DNA OR RNA); SEVERE ACUTE RESPIRATORY SYNDROME CORONAVIRUS 2 (SARS-COV-2) (CORONAVIRUS DISEASE [COVID-19]), AMPLIFIED PROBE TECHNIQUE, MAKING USE OF HIGH THROUGHPUT TECHNOLOGIES AS DESCRIBED BY CMS-2020-01-R: HCPCS | Performed by: PHYSICIAN ASSISTANT

## 2021-06-12 PROCEDURE — U0005 INFEC AGEN DETEC AMPLI PROBE: HCPCS | Performed by: PHYSICIAN ASSISTANT

## 2021-06-12 RX ORDER — NICOTINE POLACRILEX 4 MG/1
GUM, CHEWING ORAL
COMMUNITY
Start: 2021-03-10

## 2021-06-12 RX ORDER — AZITHROMYCIN 250 MG/1
TABLET, FILM COATED ORAL
Qty: 6 TABLET | Refills: 0 | Status: SHIPPED | OUTPATIENT
Start: 2021-06-12 | End: 2021-06-16

## 2021-06-12 RX ORDER — FLUTICASONE PROPIONATE 50 MCG
SPRAY, SUSPENSION (ML) NASAL
COMMUNITY
Start: 2020-12-18

## 2021-06-12 RX ORDER — SILDENAFIL 100 MG/1
TABLET, FILM COATED ORAL
COMMUNITY
Start: 2021-04-08

## 2021-06-12 RX ORDER — TAMSULOSIN HYDROCHLORIDE 0.4 MG/1
CAPSULE ORAL
COMMUNITY
Start: 2021-04-08

## 2021-06-12 RX ORDER — LORATADINE 10 MG/1
TABLET ORAL
COMMUNITY
Start: 2020-12-18

## 2021-06-12 RX ORDER — EZETIMIBE 10 MG/1
TABLET ORAL
COMMUNITY
Start: 2021-02-12

## 2021-06-13 ENCOUNTER — TELEPHONE (OUTPATIENT)
Dept: URGENT CARE | Facility: MEDICAL CENTER | Age: 66
End: 2021-06-13

## 2021-06-13 LAB — SARS-COV-2 RNA RESP QL NAA+PROBE: NEGATIVE

## 2021-06-13 NOTE — PROGRESS NOTES
[address] Now        NAME: Cal Soliz is a 73 y o  male  : 1955    MRN: [id number]  DATE: 2021  TIME: 8:29 PM    Assessment and Plan   Pre-employment examination [Z02 1]  1  Pre-employment examination  Novel Coronavirus (Covid-19),PCR [facility] - Office Collection   2  Encounter for laboratory testing for COVID-19 virus  XR chest pa & lateral   3  Cough  azithromycin (ZITHROMAX) 250 mg tablet     Patient was asked if he felt safe enough to drive and he said yes  Patient was asked multiple times if he had chest pain or shortness of breath and he denies  Patient was educated on high pulse  Chest xray- possible pneumonia pending radiology report  Patient Instructions       Patient was educated on [place] [address]  Patient was educated to stay quarantined until results are back  Patient was educated on hydration  Patient was told to go to ED if she begins to have chest pain,shortness of breath  Patient was placed on antibiotics for cough and possible pneumonia  Patient was told to take as prescribed  Patient was told if he has any chest pain go to ED  Patient understood  Chief Complaint     Chief Complaint   Patient presents with    Diarrhea     Patient presents with sinus pressure, headaches, cough and diarrhea  He notes that he has had a cough for a few weeks  He is taking allergy medications  He has a fever of 100 8  History of Present Illness       Patient repots he has been treated for the last three weeks by the [facility] for allergies  Patient reports he took OTC allergy medication and nasal sprays with minimal relief  Patient reports he was doing ok but today 21 he woke up with extreme nausea, chills, headache, wet cough and had 6 bouts of diarrhea  Denies any COVID 19 exposures  Patient was vaccinated with Chase Cartwright  Denies back pain  Denies feeling like his chest is heavy        Review of Systems   Review of Systems   Constitutional: Positive for chills, fatigue and fever    HENT: Positive for congestion  Negative for sinus pressure, sinus pain and sore throat  Respiratory: Positive for cough  Gastrointestinal: Positive for diarrhea and nausea  Musculoskeletal: Positive for myalgias  Neurological: Positive for headaches  Psychiatric/Behavioral: Negative            Current Medications       Current Outpatient Medications:     albuterol (Ventolin HFA) 90 mcg/act inhaler, Inhale 2 puffs every 6 (six) hours as needed for wheezing, Disp: 18 g, Rfl: 0    Alirocumab 75 MG/ML SOAJ, INJECT 75MG (ONE PEN-FUL) SUBCUTANEOUSLY EVERY 2 WEEKS, Disp: , Rfl:     Cetirizine HCl (ZYRTEC ALLERGY) 10 MG CAPS, Take 1 capsule by mouth daily, Disp: , Rfl:     cholecalciferol (VITAMIN D3) 1,000 units tablet, Take 1,000 Units by mouth daily, Disp: , Rfl:     Cinnamon 500 MG capsule, Take 1,000 mg by mouth daily, Disp: , Rfl:     ezetimibe (ZETIA) 10 mg tablet, TAKE ONE-HALF TABLET BY MOUTH 30 MINUTES PRIOR TO BREAKFAST FOR CHOLESTEROL, Disp: , Rfl:     fluticasone (FLONASE) 50 mcg/act nasal spray, INSTILL 2 SPRAYS IN NOSTRIL(S) EVERY DAY FOR NASAL ALLERGIES, Disp: , Rfl:     hydroxychloroquine (PLAQUENIL) 200 mg tablet, Take 200 mg by mouth 2 (two) times a day with meals, Disp: , Rfl:     loratadine (CLARITIN) 10 mg tablet, TAKE ONE TABLET BY MOUTH EVERY MORNING FOR ALLERGIES, Disp: , Rfl:     losartan (COZAAR) 25 mg tablet, Take 12 5 mg by mouth , Disp: , Rfl:     MAGNESIUM PO, Take 1 tablet by mouth daily, Disp: , Rfl:     montelukast (SINGULAIR) 10 mg tablet, Take 10 mg by mouth daily at bedtime, Disp: , Rfl:     Na Sulfate-K Sulfate-Mg Sulf 17 5-3 13-1 6 GM/177ML SOLN, As directed, Disp: 1 Bottle, Rfl: 0    OLIVE LEAF EXTRACT PO, Take 1 capsule by mouth daily, Disp: , Rfl:     Omeprazole 20 MG TBEC, TAKE ONE CAPSULE BY MOUTH EVERY DAY FOR STOMACH OR REFLUX (TAKE IN THE MORNING 30 MINUTES PRIOR TO BREAKFAST), Disp: , Rfl:     potassium chloride (K-DUR,KLOR-CON) 10 mEq tablet, Take 10 mEq by mouth daily, Disp: , Rfl:     sildenafil (VIAGRA) 100 mg tablet, TAKE ONE-HALF TABLET BY MOUTH EVERY DAY AS NEEDED PRIOR TO SEXUAL ACTIVITY FOR ERECTILE DYSFUNCTION, Disp: , Rfl:     tamsulosin (FLOMAX) 0 4 mg, TAKE 1 CAPSULE BY MOUTH EVERY MORNING FOR PROSTATE/NIGHTTIME URINATION (BPH), Disp: , Rfl:     azithromycin (ZITHROMAX) 250 mg tablet, Take 2 tablets today then 1 tablet daily x 4 days, Disp: 6 tablet, Rfl: 0    pantoprazole (PROTONIX) 40 mg tablet, Take 40 mg by mouth daily, Disp: , Rfl:     Current Allergies     Allergies as of 06/12/2021 - Reviewed 06/12/2021   Allergen Reaction Noted    Atorvastatin Myalgia 10/30/2006    Colestipol Myalgia 04/25/2012    Crestor  [rosuvastatin calcium] Myalgia 08/10/2007    Gadolinium Other (See Comments) 06/10/2009    Monascus Sia Bella went yeast Myalgia 04/10/2013    Statins Myalgia 01/21/2019            The following portions of the patient's history were reviewed and updated as appropriate: allergies, current medications, past family history, past medical history, past social history, past surgical history and problem list      Past Medical History:   Diagnosis Date    Bundle branch block     Frozen shoulder     right    GERD (gastroesophageal reflux disease)     Hypertension     [facility] [address] [facility]     Seasonal allergies     Sleep apnea        Past Surgical History:   Procedure Laterality Date    APPENDECTOMY      COLONOSCOPY      EGD      JOINT REPLACEMENT      KNEE ARTHROSCOPY      SD COLONOSCOPY FLX DX W/COLLJ SPEC WHEN PFRMD N/A 5/2/2019    Procedure: COLONOSCOPY;  Surgeon: Tejal Do MD;  [facility]; Service: Gastroenterology    REPLACEMENT TOTAL KNEE         History reviewed  No pertinent family history  Medications have been verified  Objective   Pulse (!) 107   Temp (!) 100 8 °F (38 2 °C) (Tympanic)   Resp 18   SpO2 97%   No LMP for male patient         Physical Exam Physical Exam  Constitutional:       Appearance: He is normal weight  HENT:      Head: Normocephalic  Comments: No pain over frontal or maxillary sinus     Right Ear: Tympanic membrane, ear canal and external ear normal       Left Ear: Tympanic membrane, ear canal and external ear normal       Nose: Nose normal       Mouth/Throat:      Mouth: Mucous membranes are moist       Pharynx: No oropharyngeal exudate or posterior oropharyngeal erythema  Eyes:      Extraocular Movements: Extraocular movements intact  Pupils: Pupils are equal, round, and reactive to light  Neck:      Comments: No palpable lymph nodes  Cardiovascular:      Rate and Rhythm: Regular rhythm  Tachycardia present  Heart sounds: Normal heart sounds  Pulmonary:      Breath sounds: Wheezing present  Abdominal:      Palpations: Abdomen is soft  Tenderness: There is abdominal tenderness  Neurological:      General: No focal deficit present  Mental Status: He is alert and oriented to person, place, and time     Psychiatric:         Mood and Affect: Mood normal          Behavior: Behavior normal

## 2021-06-13 NOTE — PATIENT INSTRUCTIONS
Patient was educated on [place] [facility]  Patient was educated to stay quarantined until results are back  Patient was educated on hydration  Patient was told to go to ED if she begins to have chest pain,shortness of breath  Patient was placed on antibiotics for cough and possible pneumonia  Patient was told to take as prescribed  Patient was told if he has any chest pain go to ED  Patient understood  COVID-19 (Coronavirus Disease 2019)   WHAT YOU NEED TO KNOW:   COVID-19 is the disease caused by the novel (new) coronavirus first discovered in December 2019  Coronaviruses generally cause upper respiratory (nose, throat, and lung) infections, such as a cold  The new virus can also cause serious lower respiratory conditions, such as pneumonia or acute respiratory distress syndrome (ARDS)  Anyone can develop serious problems from the new virus, but your risk is higher if you are 65 or older  A weak immune system, diabetes, or a heart or lung condition can also increase your risk  DISCHARGE INSTRUCTIONS:   If you think you or someone you know may be infected:  Do the following to protect others:  · If emergency care is needed,  tell the  about the possible infection, or call ahead and tell the emergency department  · Call a healthcare provider  for instructions if symptoms are mild  Anyone who may be infected should not  arrive without calling first  The provider will need to protect staff members and other patients  · The person who may be infected needs to wear a face covering  while getting medical care  This will help lower the risk of infecting others  Coverings are not used for anyone who is younger than 2 years, has breathing problems, or cannot remove it  The provider can give you instructions for anyone who cannot wear a covering      Call your local emergency number (911 in the [address] [place] [address],[address]) or go to the emergency department if:   · You have trouble breathing or shortness of breath at rest     · You have chest pain or pressure that lasts longer than 5 minutes  · You become confused or hard to wake  · Your lips or face are blue  · You have a fever of 104°F (40°C) or higher  Call your doctor if:   · You do not  have symptoms of COVID-19 but had close physical contact within 14 days with someone who tested positive  · You have questions or concerns about your condition or care  Medicines: You may need any of the following for mild symptoms:  · Decongestants  help reduce nasal congestion and help you breathe more easily  If you take decongestant pills, they may make you feel restless or cause problems with your sleep  Do not use decongestant sprays for more than a few days  · Cough suppressants  help reduce coughing  Ask your healthcare provider which type of cough medicine is best for you  · Acetaminophen  decreases pain and fever  It is available without a doctor's order  Ask how much to take and how often to take it  Follow directions  Read the labels of all other medicines you are using to see if they also contain acetaminophen, or ask your doctor or pharmacist  Acetaminophen can cause liver damage if not taken correctly  Do not use more than 4 grams (4,000 milligrams) total of acetaminophen in one day  · NSAIDs , such as ibuprofen, help decrease swelling, pain, and fever  NSAIDs can cause stomach bleeding or kidney problems in certain people  If you take blood thinner medicine, always ask your healthcare provider if NSAIDs are safe for you  Always read the medicine label and follow directions  · Take your medicine as directed  Contact your healthcare provider if you think your medicine is not helping or if you have side effects  Tell him or her if you are allergic to any medicine  Keep a list of the medicines, vitamins, and herbs you take  Include the amounts, and when and why you take them  Bring the list or the pill bottles to follow-up visits   Carry your medicine list with you in case of an emergency  How the 2019 coronavirus spreads: The virus spreads quickly and easily  You can become infected if you are in contact with a large amount of the virus, even for a short time  You can also become infected by being around a small amount of virus for a long time  The following are ways the virus is thought to spread, but more information may be coming:  · Droplets are the most common way all coronaviruses spread  The virus can travel in droplets that form when a person talks, coughs, or sneezes  Anyone who breathes in the droplets or gets them in his or her eyes can become infected with the virus  Close personal contact with an infected person is thought to be the main way the virus spreads  Close personal contact means you are within 6 feet (2 meters) of the person  · Person-to-person contact can spread the virus  For example, a person with the virus on his or her hands can spread it by shaking hands with someone  At this time, it does not appear that the virus can be passed to a baby during pregnancy or delivery  The baby can be infected after he or she is born through person-to-person contact  The virus also does not appear to spread in breast milk  If you are pregnant or breastfeeding, talk to your healthcare provider or obstetrician about any concerns you have  · The virus can stay on objects and surfaces  A person can get the virus on his or her hands by touching the object or surface  Infection happens if the person then touches his or her eyes or mouth with unwashed hands  It is not yet known how long the virus can stay on an object or surface  That is why it is important to clean all surfaces that are used regularly  · An infected animal may be able to infect a person who touches it  This may happen at live markets or on a farm      How everyone can lower the risk for COVID-19:  The best way to prevent infection is to avoid anyone who is infected, but this can be hard to do  An infected person can spread the virus before signs or symptoms begin, or even if signs or symptoms never develop  The following can help lower the risk for infection:      · Wash your hands often throughout the day  Use soap and water  Rub your soapy hands together, lacing your fingers  Wash the front and back of each hand, and in between your fingers  Use the fingers of one hand to scrub under the fingernails of the other hand  Wash for at least 20 seconds  Rinse with warm, running water for several seconds  Then dry your hands with a clean towel or paper towel  Use hand  that contains alcohol if soap and water are not available  Do not touch your eyes, nose, or mouth without washing your hands first  Teach children how to wash their hands and use hand   · Cover a sneeze or cough  This prevents droplets from traveling from you to others  Turn your face away and cover your mouth and nose with a tissue  Throw the tissue away  Use the bend of your arm if a tissue is not available  Then wash your hands well with soap and water or use hand   Turn and cover your face if you are around someone who is sneezing or coughing  Teach children how to cover a cough or sneeze  · Follow worldwide, national, and local social distancing guidelines  Social distancing means people avoid close physical contact so the virus cannot spread from one person to another  Keep at least 6 feet (2 meters) between you and others  Also keep this distance from anyone who comes to your home, such as someone making a delivery  · Make a habit of not touching your face  It is not known how long the virus can stay on objects and surfaces  If you get the virus on your hands, you can transfer it to your eyes, nose, or mouth and become infected  You can also transfer it to objects, surfaces, or people  Be aware of what you touch when you go out  Examples include handrails and elevator buttons   Try not to touch anything with bare hands unless it is necessary  Wash your hands before you leave your home and when you return  · Clean and disinfect high-touch surfaces and objects often  Use a disinfecting solution or wipes  You can make a solution by diluting 4 teaspoons of bleach in 1 quart (4 cups) of water  Clean and disinfect even if you think no one living in or coming to your home is infected with the virus  You can wipe items with a disinfecting cloth before you bring them into your home  Wash your hands after you handle anything you bring into your home  · Make your immune system as healthy as possible  A weakened immune system makes you more vulnerable to the new coronavirus  No COVID-19 vaccine is available yet  Vaccines such as the flu and pneumonia vaccines can help your immune system  Your healthcare provider can tell you which vaccines to get, and when to get them  Keep your immune system as strong as possible  Do not smoke  Eat healthy foods, exercise regularly, and try to manage stress  Go to bed and wake up at the same times each day  Social distancing guidelines:  National and local social distancing rules vary  Rules may change over time as restrictions are lifted  Restrictions may return if an outbreak happens where you live  It is important to know and follow all current social distancing rules in your area  The following are general guidelines:  · Limit trips out of your home  You may be able to have food, medicines, and other supplies delivered  If possible, have delivered items left at your door or other area  Try not to have someone hand you an item  You will be so close to the person that the virus can spread between you  · Do not have close physical contact with anyone who does not live in your home  Do not shake hands with, hug, or kiss a person as a greeting  Stand or walk as far from others as possible   If you must use public transportation (such as a bus or subway), try to sit or stand away from others  You can stay safely connected with others through phone calls, e-mail messages, social media websites, and video chats  Check in on anyone who may be having a hard time socially distancing, or who lives alone  Ask administrators at nursing homes or long-term care facilities how you can safely communicate with someone living there  · Wear a cloth face covering around others who do not live in your home  Face coverings help prevent the virus from spreading to others in droplets  You can use a clear face covering if someone needs to read your lips  This is a cloth covering that has plastic over the mouth area so your lips can be seen  Do not use coverings that have breathing valves or vents  The virus can travel out of the valve or vent and be spread to others  Do not take your covering off to talk, cough, or sneeze  Do not use coverings on children younger than 2 years or on anyone who has breathing problems or cannot remove it  · Only allow medical or other necessary professionals into your home  Wear your face covering, and remind professionals to wear a face covering  Remind them to wash their hands when they arrive and before they leave  Do not  let anyone who does not live in your home in, even if the person is not sick  A person can pass the virus to others before symptoms of COVID-19 begin  Some people never even develop symptoms  Children commonly have mild symptoms or no symptoms  It may be hard to tell a child not to hug or kiss you  Explain that this is how he or she can help you stay healthy  · Do not go to someone else's home unless it is necessary  Do not go over to visit, even if the person is lonely  Only go if you need to help him or her  Make sure you both wear face coverings while you are there  · Avoid large gatherings and crowds  Gatherings or crowds of 10 or more individuals can cause the virus to spread   Examples of gatherings include parties, sporting events, Temple services, and conferences  Crowds may form at beaches, jackman, and tourist attractions  Protect yourself by staying away from large gatherings and crowds  · Ask your healthcare provider for other ways to have appointments  You may be able to have appointments without having to go into the provider's office  Some providers offer phone, video, or other types of appointments  You may also be able to get prescriptions for a few months of your medicines at a time  · Stay safe if you must go out to work  You may have a job that can only be done outside your home  Keep physical distance between you and other workers as much as possible  Follow your employer's rules so everyone stays safe  If you have COVID-19 and are recovering at home:  Healthcare providers will give you specific instructions to follow  The following are general guidelines to remind you how to keep others safe until you are well:  · Wash your hands often  Use soap and water as much as possible  You can use hand  that contains alcohol if soap and water are not available  Do not share towels with anyone  If you use paper towels, throw them away in a lined trash can kept in your room or area  Use a covered trash can, if possible  · Do not go out of your home unless it is necessary  You may have to go to your healthcare provider's office for check-ups or to get prescription refills  Do not arrive at the provider's office without an appointment  Providers have to make their offices safe for staff and other patients  · Do not have close physical contact with anyone unless it is necessary  Only have close physical contact with a person giving direct care, or a baby or child you must care for  Family members and friends should not visit you  If possible, stay in a separate area or room of your home if you live with others  No one should go into the area or room except to give you care   You can visit with others by phone, video chat, e-mail, or similar systems  It is important to stay connected with others in your life while you recover  · Wear a face covering while others are near you  This can help prevent droplets from spreading the virus when you talk, sneeze, or cough  Put the covering on before anyone comes into your room or area  Remind the person to cover his or her nose and mouth before going in to provide care for you  · Do not share items  Do not share dishes, towels, or other items with anyone  Items need to be washed after you use them  · Protect your baby  Wash your hands with soap and water often throughout the day  Wear a clean face covering while you breastfeed, or while you express or pump breast milk  If possible, ask someone who is well to care for your baby  You can put breast milk in bottles for the person to use, if needed  Talk to your healthcare provider if you have any questions or concerns about caring for or bonding with your baby  He or she will tell you when to bring your baby in for check-ups and vaccines  He or she will also tell you what to do if you think your baby was infected with the new virus  · Do not handle live animals  Until more is known, it is best not to touch, play with, or handle live animals  Some animals, including pets, have been infected with the new coronavirus  Do not handle or care for animals until you are well  Care includes feeding, petting, and cuddling your pet  Do not let your pet lick you or share your food  Ask someone who is not infected to take care of your pet, if possible  If you must care for a pet, wear a face covering  Wash your hands before and after you give care  · Follow directions from your healthcare provider for being around others after you recover  You will need to wait at least 10 days after symptoms first appeared  Then you will need to have no fever for 24 hours without fever medicine, and no other symptoms   A loss of taste or smell may continue for several months  It is considered okay to be around others if this is your only symptom  It is not known for sure if or for how long a recovered person can pass the virus to others  Your provider may give you instructions, such as continuing social distancing or wearing a face covering around others  How to take care of someone who has COVID-19:  If the person lives in another home, arrange for a time to give care  Remember to bring a few pairs of disposable gloves and a cloth face covering  The following are general guidelines to help you safely care for anyone who has COVID-19:  · Wash your hands often  Wash before and after you go into the person's home, area, or room  Throw paper towels away in a lined trash can that has a lid, if possible  · Do not allow others to go near the person  No one should come into the person's home unless it is necessary  If possible, the person should be in a separate area or room if he or she lives with others  Keep the room's door shut unless you need to go in or out  Have others call, video chat, or e-mail the person if he or she is feeling well enough  The person may feel lonely if he or she is kept separate for a long period of time  Safe communication can help him or her stay connected to family and friends  · Make sure the person's room has good air flow  You may be able to open the window if the weather allows  An air conditioner can also be turned on to help air move  · Contact the person before you go in to give care  Make sure the person is wearing a face covering  Remind him or her to wash his or her hands with soap and water  He or she can use hand  that contains alcohol if soap and water are not available  Put on a face covering before you go in to give care  · Wear gloves while you give care and clean  Clean items the person uses often   Clean countertops, cooking surfaces, and the fronts and insides of the microwave and refrigerator  Clean the shower, toilet, the area around the toilet, the sink, the area around the sink, and faucets  Gather used laundry or bedding  Wash and dry items on the warmest settings the fabric allows  Wash dishes and silverware in hot, soapy water or in a   · Anything you throw away needs to go into a lined trash can  When you need to empty the trash, close the open end of the lining and tie it closed  This helps prevent items the virus is on from spilling out of the trash  Remove your gloves and throw them away  Wash your hands  Follow up with your doctor as directed:  Write down your questions so you remember to ask them during your visits  For more information:   · Centers for Disease Control and Prevention  [address] , [address]  Phone: 4- 633 - 899-4467  Web Address: [employer] com br    © Copyright [address] Information is for End User's use only and may not be sold, redistributed or otherwise used for commercial purposes  All illustrations and images included in CareNotes® are the copyrighted property of [employer]  or [address] [place], [place] [address]manoj Alonso   The above information is an  only  It is not intended as medical advice for individual conditions or treatments  Talk to your doctor, nurse or pharmacist before following any medical regimen to see if it is safe and effective for you

## 2021-06-13 NOTE — TELEPHONE ENCOUNTER
Patient was called to go over chest xray  Patient was educated chest xray was negative  Patient reports he still has chest congestion  I recommend patient go to ED he declined  Waiting on COVID 19 results

## 2021-06-13 NOTE — TELEPHONE ENCOUNTER
Patient was called to check if his chest felt heavy due to high pulse and him denying chest pain  Patient said no heaviness and no back pain

## 2021-06-15 ENCOUNTER — HOSPITAL ENCOUNTER (EMERGENCY)
Facility: HOSPITAL | Age: 66
Discharge: HOME | End: 2021-06-15
Attending: EMERGENCY MEDICINE | Admitting: EMERGENCY MEDICINE
Payer: COMMERCIAL

## 2021-06-15 VITALS
OXYGEN SATURATION: 98 % | RESPIRATION RATE: 20 BRPM | HEART RATE: 71 BPM | TEMPERATURE: 98.3 F | DIASTOLIC BLOOD PRESSURE: 129 MMHG | SYSTOLIC BLOOD PRESSURE: 165 MMHG

## 2021-06-15 DIAGNOSIS — I10 HYPERTENSION: ICD-10-CM

## 2021-06-15 DIAGNOSIS — R05.9 COUGH: Primary | ICD-10-CM

## 2021-06-15 PROCEDURE — 99282 EMERGENCY DEPT VISIT SF MDM: CPT | Performed by: EMERGENCY MEDICINE

## 2021-06-15 PROCEDURE — 99283 EMERGENCY DEPT VISIT LOW MDM: CPT

## 2021-06-15 NOTE — ED PROVIDER NOTES
History  Chief Complaint   Patient presents with    Cough     Pt stated that h has been coughing  Seen by Tye barber recently on Saturday  COVID and pneumonia negative  Sputum is clear  60-year-old male presents to the emergency department for evaluation of a cough  The patient reports that he was having viral symptoms that started approximately 1 week ago  Went to urgent care 3 days ago with the patient received a chest x-ray, COVID testing and was placed on a Z-Kevin  Per chart review the patient's chest x-ray was within normal limits  COVID testing was negative  He reports that he has long term done with a Z-Kevin and now his diarrhea, fever and headache have resolved  States that he still has a cough productive of white sputum  States that he has a chronic cough at baseline but reports that is normally dry  Patient reports smoking for 17 years but quitting in the early 46s  He denies fevers, chills, nausea, vomiting, diarrhea, chest pain, shortness of breath, recent travel and sick contacts  Prior to Admission Medications   Prescriptions Last Dose Informant Patient Reported? Taking?    Alirocumab 75 MG/ML SOAJ   Yes No   Sig: INJECT 75MG (ONE PEN-FUL) SUBCUTANEOUSLY EVERY 2 WEEKS   Cetirizine HCl (ZYRTEC ALLERGY) 10 MG CAPS  Self Yes No   Sig: Take 1 capsule by mouth daily   Cinnamon 500 MG capsule  Self Yes No   Sig: Take 1,000 mg by mouth daily   MAGNESIUM PO  Self Yes No   Sig: Take 1 tablet by mouth daily   Na Sulfate-K Sulfate-Mg Sulf 17 5-3 13-1 6 GM/177ML SOLN   No No   Sig: As directed   OLIVE LEAF EXTRACT PO  Self Yes No   Sig: Take 1 capsule by mouth daily   Omeprazole 20 MG TBEC   Yes No   Sig: TAKE ONE CAPSULE BY MOUTH EVERY DAY FOR STOMACH OR REFLUX (TAKE IN THE MORNING 30 MINUTES PRIOR TO BREAKFAST)   albuterol (Ventolin HFA) 90 mcg/act inhaler   No No   Sig: Inhale 2 puffs every 6 (six) hours as needed for wheezing   azithromycin (ZITHROMAX) 250 mg tablet   No No Sig: Take 2 tablets today then 1 tablet daily x 4 days   cholecalciferol (VITAMIN D3) 1,000 units tablet  Self Yes No   Sig: Take 1,000 Units by mouth daily   ezetimibe (ZETIA) 10 mg tablet   Yes No   Sig: TAKE ONE-HALF TABLET BY MOUTH 30 MINUTES PRIOR TO BREAKFAST FOR CHOLESTEROL   fluticasone (FLONASE) 50 mcg/act nasal spray   Yes No   Sig: INSTILL 2 SPRAYS IN NOSTRIL(S) EVERY DAY FOR NASAL ALLERGIES   hydroxychloroquine (PLAQUENIL) 200 mg tablet  Self Yes No   Sig: Take 200 mg by mouth 2 (two) times a day with meals   loratadine (CLARITIN) 10 mg tablet   Yes No   Sig: TAKE ONE TABLET BY MOUTH EVERY MORNING FOR ALLERGIES   losartan (COZAAR) 25 mg tablet  Self Yes No   Sig: Take 12 5 mg by mouth    montelukast (SINGULAIR) 10 mg tablet   Yes No   Sig: Take 10 mg by mouth daily at bedtime   pantoprazole (PROTONIX) 40 mg tablet   Yes No   Sig: Take 40 mg by mouth daily   potassium chloride (K-DUR,KLOR-CON) 10 mEq tablet  Self Yes No   Sig: Take 10 mEq by mouth daily   sildenafil (VIAGRA) 100 mg tablet   Yes No   Sig: TAKE ONE-HALF TABLET BY MOUTH EVERY DAY AS NEEDED PRIOR TO SEXUAL ACTIVITY FOR ERECTILE DYSFUNCTION   tamsulosin (FLOMAX) 0 4 mg   Yes No   Sig: TAKE 1 CAPSULE BY MOUTH EVERY MORNING FOR PROSTATE/NIGHTTIME URINATION (BPH)      Facility-Administered Medications: None       Past Medical History:   Diagnosis Date    Bundle branch block     Frozen shoulder     right    GERD (gastroesophageal reflux disease)     Hypertension     RA (rheumatoid arthritis) (HCC)     Seasonal allergies     Sleep apnea        Past Surgical History:   Procedure Laterality Date    APPENDECTOMY      COLONOSCOPY      EGD      JOINT REPLACEMENT      KNEE ARTHROSCOPY      MT COLONOSCOPY FLX DX W/COLLJ SPEC WHEN PFRMD N/A 5/2/2019    Procedure: COLONOSCOPY;  Surgeon: Min Del Real MD;  [facility]; Service: Gastroenterology    REPLACEMENT TOTAL KNEE         History reviewed  No pertinent family history    I have reviewed and agree with the history as documented  E-Cigarette/Vaping    E-Cigarette Use Never User      E-Cigarette/Vaping Substances     Social History     Tobacco Use    Smoking status: Former Smoker     Packs/day: 1 50     Types: Cigarettes     Quit date:      Years since quittin 4    Smokeless tobacco: Never Used   Vaping Use    Vaping Use: Never used   Substance Use Topics    Alcohol use: Yes     Comment: social    Drug use: Never        Review of Systems   Constitutional: Negative for chills and fever  HENT: Negative for ear pain and sore throat  Eyes: Negative for pain and visual disturbance  Respiratory: Positive for cough  Negative for shortness of breath  Cardiovascular: Negative for chest pain and palpitations  Gastrointestinal: Negative for abdominal pain and vomiting  Genitourinary: Negative for dysuria and hematuria  Musculoskeletal: Negative for arthralgias and back pain  Skin: Negative for color change and rash  Neurological: Negative for seizures and syncope  All other systems reviewed and are negative  Physical Exam  ED Triage Vitals [06/15/21 1757]   Temperature Pulse Respirations Blood Pressure SpO2   98 3 °F (36 8 °C) 71 20 (!) 165/129 98 %      Temp src Heart Rate Source Patient Position - Orthostatic VS BP Location FiO2 (%)   -- -- -- -- --      Pain Score       --             Orthostatic Vital Signs  Vitals:    06/15/21 1757   BP: (!) 165/129   Pulse: 71       Physical Exam  Vitals and nursing note reviewed  Constitutional:       Appearance: He is well-developed  HENT:      Head: Normocephalic and atraumatic  Eyes:      Conjunctiva/sclera: Conjunctivae normal    Cardiovascular:      Rate and Rhythm: Normal rate and regular rhythm  Heart sounds: No murmur heard  Pulmonary:      Effort: Pulmonary effort is normal  No respiratory distress  Breath sounds: Normal breath sounds  Abdominal:      Palpations: Abdomen is soft  Tenderness: There is no abdominal tenderness  Musculoskeletal:      Cervical back: Neck supple  Skin:     General: Skin is warm and dry  Neurological:      Mental Status: He is alert  ED Medications  Medications - No data to display    Diagnostic Studies  Results Reviewed     None                 No orders to display         Procedures  Procedures      ED Course               Identification of Seniors at Risk      Most Recent Value   (ISAR) Identification of Seniors at Risk   Before the illness or injury that brought you to the Emergency, did you need someone to help you on a regular basis? 0 Filed at: 06/15/2021 1801   In the last 24 hours, have you needed more help than usual?  0 Filed at: 06/15/2021 1801   Have you been hospitalized for one or more nights during the past 6 months? 0 Filed at: 06/15/2021 1801   In general, do you see well?  0 Filed at: 06/15/2021 1801   In general, do you have serious problems with your memory? 0 Filed at: 06/15/2021 1801   Do you take more than three different medications every day? 1 Filed at: 06/15/2021 1801   ISAR Score  1 Filed at: 06/15/2021 1801                              MDM  Number of Diagnoses or Management Options  Cough  Hypertension  Diagnosis management comments: 71-year-old male presented to the emergency department for evaluation of a cough  On arrival patient was awake, alert, oriented and in no acute distress  Initial vital signs show that the patient was hypertensive but otherwise his vital signs were stable  Physical exam was unremarkable including a normal pulmonary examination  Per chart review the patient had a recent chest x-ray with no acute cardiopulmonary process  The patient also with recent COVID testing that was negative as well as being vaccinated against COVID-19  No indication for repeat chest x-ray for COVID testing at this time  The patient was counseled on viral symptoms as well as prolonged cough    Recommendation was made for the patient to treat himself symptomatically with buckwheat honey and to follow up with his PCP for continued symptoms  Return precautions were discussed  Patient agrees with the plan for discharge and feels comfortable to go home with proper f/u  Advised to return for worsening or additional problems  Diagnostic tests were reviewed and questions answered  Diagnosis, care plan and treatment options were discussed  The patient understands instructions and will follow up as directed  Disposition  Final diagnoses:   Cough   Hypertension     Time reflects when diagnosis was documented in both MDM as applicable and the Disposition within this note     Time User Action Codes Description Comment    6/15/2021  6:37 PM Wally Kehr Add [R05] Cough     6/15/2021  6:39 PM Wally Kehr Add [I10] Hypertension       ED Disposition     ED Disposition Condition Date/Time Comment    Discharge Stable Tue Toni 15, 2021  6:37 PM Denisa Stanley discharge to home/self care              Follow-up Information     Follow up With Specialties Details Why Contact Info Additional Information    [employer]  Call   [phone]       [address] [place] [address] Emergency Department Emergency Medicine Go to  If symptoms worsen [address]  [address] [place] [address] [address] [facility] Emergency Department, [address]          Discharge Medication List as of 6/15/2021  6:39 PM      CONTINUE these medications which have NOT CHANGED    Details   albuterol (Ventolin HFA) 90 mcg/act inhaler Inhale 2 puffs every 6 (six) hours as needed for wheezing, Starting Thu 1/21/2021, Normal      Alirocumab 75 MG/ML SOAJ INJECT 75MG (ONE PEN-FUL) SUBCUTANEOUSLY EVERY 2 WEEKS, Historical Med      azithromycin (ZITHROMAX) 250 mg tablet Take 2 tablets today then 1 tablet daily x 4 days, Normal      Cetirizine HCl (ZYRTEC ALLERGY) 10 MG CAPS Take 1 capsule by mouth daily, Historical Med      cholecalciferol (VITAMIN D3) 1,000 units tablet Take 1,000 Units by mouth daily, Historical Med      Cinnamon 500 MG capsule Take 1,000 mg by mouth daily, Historical Med      ezetimibe (ZETIA) 10 mg tablet TAKE ONE-HALF TABLET BY MOUTH 30 MINUTES PRIOR TO BREAKFAST FOR CHOLESTEROL, Historical Med      fluticasone (FLONASE) 50 mcg/act nasal spray INSTILL 2 SPRAYS IN NOSTRIL(S) EVERY DAY FOR NASAL ALLERGIES, Historical Med      hydroxychloroquine (PLAQUENIL) 200 mg tablet Take 200 mg by mouth 2 (two) times a day with meals, Historical Med      loratadine (CLARITIN) 10 mg tablet TAKE ONE TABLET BY MOUTH EVERY MORNING FOR ALLERGIES, Historical Med      losartan (COZAAR) 25 mg tablet Take 12 5 mg by mouth , Historical Med      MAGNESIUM PO Take 1 tablet by mouth daily, Historical Med      montelukast (SINGULAIR) 10 mg tablet Take 10 mg by mouth daily at bedtime, Historical Med      Na Sulfate-K Sulfate-Mg Sulf 17 5-3 13-1 6 GM/177ML SOLN As directed, Sample      OLIVE LEAF EXTRACT PO Take 1 capsule by mouth daily, Historical Med      Omeprazole 20 MG TBEC TAKE ONE CAPSULE BY MOUTH EVERY DAY FOR STOMACH OR REFLUX (TAKE IN THE MORNING 30 MINUTES PRIOR TO BREAKFAST), Historical Med      pantoprazole (PROTONIX) 40 mg tablet Take 40 mg by mouth daily, Historical Med      potassium chloride (K-DUR,KLOR-CON) 10 mEq tablet Take 10 mEq by mouth daily, Historical Med      sildenafil (VIAGRA) 100 mg tablet TAKE ONE-HALF TABLET BY MOUTH EVERY DAY AS NEEDED PRIOR TO SEXUAL ACTIVITY FOR ERECTILE DYSFUNCTION, Historical Med      tamsulosin (FLOMAX) 0 4 mg TAKE 1 CAPSULE BY MOUTH EVERY MORNING FOR PROSTATE/NIGHTTIME URINATION (BPH), Historical Med           No discharge procedures on file  PDMP Review     None           ED Provider  Attending physically available and evaluated Linda Matoser  I managed the patient along with the ED Attending      Electronically Signed by         Abida Rondon MD  06/15/21 7434

## 2021-06-15 NOTE — DISCHARGE INSTRUCTIONS
Follow-up with PCP for continued symptoms  You can treat your cough with buckwheat honey as discussed

## 2021-06-15 NOTE — ED ATTENDING ATTESTATION
6/15/2021  IWalter DO, saw and evaluated the patient  I have discussed the patient with the resident/non-physician practitioner and agree with the resident's/non-physician practitioner's findings, Plan of Care, and MDM as documented in the resident's/non-physician practitioner's note, except where noted  All available labs and Radiology studies were reviewed  I was present for key portions of any procedure(s) performed by the resident/non-physician practitioner and I was immediately available to provide assistance  At this point I agree with the current assessment done in the Emergency Department  I have conducted an independent evaluation of this patient a history and physical is as follows:    Patient is a 70-year-old male, 1 week ago began having a dry nonproductive cough, some occasional runny nose, several episodes of nonbloody, non mucousy diarrhea with some mild nausea but no vomiting  The diarrhea improved and resolved 3 days ago  Was vaccinated for COVID, received his 2nd dose about 1 month ago  There is no travel history, no sick contacts, no recent hospitalizations  No chest pain, no palpitations, no focal weakness  No myalgias, no arthralgias, no change in taste or smell  No recent antibiotics, has been using over-the-counter Zicam without significant relief  Three days ago he was seen in urgent care, chest x-ray was unremarkable, COVID test was negative, was placed on a Z-Kevin which he is currently taking  He presents today because the cough is not improved, and he was advised by family to have a re-evaluation  General:  Patient is well-appearing  Head:  Atraumatic  Eyes:  Conjunctiva pink  ENT:  Mucous membranes are moist, Mucous membranes moist  No swelling of the posterior pharynx  No tonsillar enlargement, exudate, lesions  No swelling in the floor of the mouth  No uvula deviation  No trismus    Neck:  Supple, no stridor  Cardiac:  S1-S2, without murmurs  Lungs:  Clear to auscultation bilaterally  Abdomen:  Soft, nontender, normal bowel sounds, no CVA tenderness, no tympany, no rigidity, no guarding  Extremities:  Normal range of motion  Neurologic:  Awake, fluent speech, normal comprehension  AAOx3  Skin:  Pink warm and dry  Psychiatric:  Alert, pleasant, cooperative            ED Course     Patient's lungs are clear, may just have had a mild viral syndrome with lingering cough  Do not believe he requires repeat imaging, do not believe a repeat COVID test is indicated as well  Will recommend symptomatic treatment with buckwheat honey for the cough and PCP follow-up  Supportive care, importance of follow-up and return precautions were discussed with the patient, who expressed understanding        Critical Care Time  Procedures

## 2022-06-06 ENCOUNTER — HOSPITAL ENCOUNTER (EMERGENCY)
Facility: HOSPITAL | Age: 67
Discharge: HOME | End: 2022-06-06
Attending: EMERGENCY MEDICINE
Payer: COMMERCIAL

## 2022-06-06 ENCOUNTER — APPOINTMENT (EMERGENCY)
Dept: RADIOLOGY | Facility: HOSPITAL | Age: 67
End: 2022-06-06
Payer: COMMERCIAL

## 2022-06-06 VITALS
OXYGEN SATURATION: 98 % | RESPIRATION RATE: 20 BRPM | TEMPERATURE: 97.9 F | SYSTOLIC BLOOD PRESSURE: 159 MMHG | HEART RATE: 52 BPM | DIASTOLIC BLOOD PRESSURE: 75 MMHG

## 2022-06-06 DIAGNOSIS — K80.50 BILIARY COLIC: ICD-10-CM

## 2022-06-06 DIAGNOSIS — K80.20 CHOLELITHIASIS: Primary | ICD-10-CM

## 2022-06-06 LAB
ALBUMIN SERPL BCP-MCNC: 3.8 G/DL (ref 3.5–5)
ALP SERPL-CCNC: 60 U/L (ref 46–116)
ALT SERPL W P-5'-P-CCNC: 36 U/L (ref 12–78)
ANION GAP SERPL CALCULATED.3IONS-SCNC: 6 MMOL/L (ref 4–13)
AST SERPL W P-5'-P-CCNC: 27 U/L (ref 5–45)
BASOPHILS # BLD AUTO: 0.06 THOUSANDS/ΜL (ref 0–0.1)
BASOPHILS NFR BLD AUTO: 1 % (ref 0–1)
BILIRUB SERPL-MCNC: 0.78 MG/DL (ref 0.2–1)
BUN SERPL-MCNC: 22 MG/DL (ref 5–25)
CALCIUM SERPL-MCNC: 9.6 MG/DL (ref 8.3–10.1)
CARDIAC TROPONIN I PNL SERPL HS: 30 NG/L
CHLORIDE SERPL-SCNC: 104 MMOL/L (ref 100–108)
CO2 SERPL-SCNC: 28 MMOL/L (ref 21–32)
CREAT SERPL-MCNC: 1.14 MG/DL (ref 0.6–1.3)
EOSINOPHIL # BLD AUTO: 0.12 THOUSAND/ΜL (ref 0–0.61)
EOSINOPHIL NFR BLD AUTO: 1 % (ref 0–6)
ERYTHROCYTE [DISTWIDTH] IN BLOOD BY AUTOMATED COUNT: 13.6 % (ref 11.6–15.1)
GFR SERPL CREATININE-BSD FRML MDRD: 66 ML/MIN/1.73SQ M
GLUCOSE SERPL-MCNC: 113 MG/DL (ref 65–140)
HCT VFR BLD AUTO: 45.6 % (ref 36.5–49.3)
HGB BLD-MCNC: 14.8 G/DL (ref 12–17)
IMM GRANULOCYTES # BLD AUTO: 0.08 THOUSAND/UL (ref 0–0.2)
IMM GRANULOCYTES NFR BLD AUTO: 1 % (ref 0–2)
LIPASE SERPL-CCNC: 162 U/L (ref 73–393)
LYMPHOCYTES # BLD AUTO: 1.7 THOUSANDS/ΜL (ref 0.6–4.47)
LYMPHOCYTES NFR BLD AUTO: 15 % (ref 14–44)
MCH RBC QN AUTO: 31.4 PG (ref 26.8–34.3)
MCHC RBC AUTO-ENTMCNC: 32.5 G/DL (ref 31.4–37.4)
MCV RBC AUTO: 97 FL (ref 82–98)
MONOCYTES # BLD AUTO: 0.82 THOUSAND/ΜL (ref 0.17–1.22)
MONOCYTES NFR BLD AUTO: 7 % (ref 4–12)
NEUTROPHILS # BLD AUTO: 8.23 THOUSANDS/ΜL (ref 1.85–7.62)
NEUTS SEG NFR BLD AUTO: 75 % (ref 43–75)
NRBC BLD AUTO-RTO: 0 /100 WBCS
PLATELET # BLD AUTO: 259 THOUSANDS/UL (ref 149–390)
PMV BLD AUTO: 9.1 FL (ref 8.9–12.7)
POTASSIUM SERPL-SCNC: 3.9 MMOL/L (ref 3.5–5.3)
PROT SERPL-MCNC: 7.6 G/DL (ref 6.4–8.2)
RBC # BLD AUTO: 4.72 MILLION/UL (ref 3.88–5.62)
SODIUM SERPL-SCNC: 138 MMOL/L (ref 136–145)
WBC # BLD AUTO: 11.01 THOUSAND/UL (ref 4.31–10.16)

## 2022-06-06 PROCEDURE — 80053 COMPREHEN METABOLIC PANEL: CPT | Performed by: EMERGENCY MEDICINE

## 2022-06-06 PROCEDURE — 76705 ECHO EXAM OF ABDOMEN: CPT

## 2022-06-06 PROCEDURE — 96374 THER/PROPH/DIAG INJ IV PUSH: CPT

## 2022-06-06 PROCEDURE — 99284 EMERGENCY DEPT VISIT MOD MDM: CPT | Performed by: EMERGENCY MEDICINE

## 2022-06-06 PROCEDURE — 93005 ELECTROCARDIOGRAM TRACING: CPT

## 2022-06-06 PROCEDURE — 99285 EMERGENCY DEPT VISIT HI MDM: CPT

## 2022-06-06 PROCEDURE — 85025 COMPLETE CBC W/AUTO DIFF WBC: CPT | Performed by: EMERGENCY MEDICINE

## 2022-06-06 PROCEDURE — 84484 ASSAY OF TROPONIN QUANT: CPT | Performed by: EMERGENCY MEDICINE

## 2022-06-06 PROCEDURE — 83690 ASSAY OF LIPASE: CPT | Performed by: EMERGENCY MEDICINE

## 2022-06-06 PROCEDURE — 76705 ECHO EXAM OF ABDOMEN: CPT | Performed by: EMERGENCY MEDICINE

## 2022-06-06 PROCEDURE — 36415 COLL VENOUS BLD VENIPUNCTURE: CPT | Performed by: EMERGENCY MEDICINE

## 2022-06-06 RX ORDER — KETOROLAC TROMETHAMINE 30 MG/ML
15 INJECTION, SOLUTION INTRAMUSCULAR; INTRAVENOUS ONCE
Status: COMPLETED | OUTPATIENT
Start: 2022-06-06 | End: 2022-06-06

## 2022-06-06 RX ADMIN — KETOROLAC TROMETHAMINE 15 MG: 30 INJECTION, SOLUTION INTRAMUSCULAR at 19:51

## 2022-06-06 NOTE — ED ATTENDING ATTESTATION
6/6/2022  I, David Reyna MD, saw and evaluated the patient  I have discussed the patient with the resident/non-physician practitioner and agree with the resident's/non-physician practitioner's findings, Plan of Care, and MDM as documented in the resident's/non-physician practitioner's note, except where noted  All available labs and Radiology studies were reviewed  I was present for key portions of any procedure(s) performed by the resident/non-physician practitioner and I was immediately available to provide assistance  At this point I agree with the current assessment done in the Emergency Department  I have conducted an independent evaluation of this patient a history and physical is as follows:    ED Course         Critical Care Time  Procedures    77 yo male with ra, htn, hld, having epigastric abdominal pain started after eating tuna fish and coffee few hours ago  Pt with chronic back pain as well  No n/v/d, no sob  No previous hx of same pain  No fever, no urinary complaints  Vss, afebrile, no acute distress, lungs cta, rrr, abdomen soft epigastric tenderness, ruq mild tenderness  Labs, lipase, bedside u/s, ekg, trop  Pain meds

## 2022-06-06 NOTE — ED PROVIDER NOTES
History  Chief Complaint   Patient presents with    Abdominal Pain     Unable to say if pain is epigastric or chest pain pain started after eating tuna fish     Chest Pain     Right below ribs, started a few mins ago  Also having back pain after seeing the chiro  Pain started after eating tuna fish salad  Denies nausea or vomiting or SOB     76-year-old male past medical history significant for rheumatoid arthritis as well as hyperlipidemia that presents ED today for epigastric pain as well as right upper quadrant pain  Patient said that the pain started after he ate tuna as well as had coffee and tea  He said that the pain is mostly located in his epigastrium but he does have some radiation to the right upper quadrant  He denies any fevers or chills  He also is complaining of some back pain but says that this in the and on for week any sees chiropractor for  He denies any fevers or chills  When asked about possible gallbladder disease he says that maybe somebody told him something about his gallbladder in the past but he is not able to tell me what  He denies any nausea or vomiting  No shortness of breath  Has not taken anything for his pain at home it  Prior to Admission Medications   Prescriptions Last Dose Informant Patient Reported? Taking?    Alirocumab 75 MG/ML SOAJ   Yes No   Sig: INJECT 75MG (ONE PEN-FUL) SUBCUTANEOUSLY EVERY 2 WEEKS   Cetirizine HCl (ZYRTEC ALLERGY) 10 MG CAPS  Self Yes No   Sig: Take 1 capsule by mouth daily   Cinnamon 500 MG capsule  Self Yes No   Sig: Take 1,000 mg by mouth daily   MAGNESIUM PO  Self Yes No   Sig: Take 1 tablet by mouth daily   Na Sulfate-K Sulfate-Mg Sulf 17 5-3 13-1 6 GM/177ML SOLN   No No   Sig: As directed   OLIVE LEAF EXTRACT PO  Self Yes No   Sig: Take 1 capsule by mouth daily   Omeprazole 20 MG TBEC   Yes No   Sig: TAKE ONE CAPSULE BY MOUTH EVERY DAY FOR STOMACH OR REFLUX (TAKE IN THE MORNING 30 MINUTES PRIOR TO BREAKFAST)   albuterol (Ventolin HFA) 90 mcg/act inhaler   No No   Sig: Inhale 2 puffs every 6 (six) hours as needed for wheezing   cholecalciferol (VITAMIN D3) 1,000 units tablet  Self Yes No   Sig: Take 1,000 Units by mouth daily   ezetimibe (ZETIA) 10 mg tablet   Yes No   Sig: TAKE ONE-HALF TABLET BY MOUTH 30 MINUTES PRIOR TO BREAKFAST FOR CHOLESTEROL   fluticasone (FLONASE) 50 mcg/act nasal spray   Yes No   Sig: INSTILL 2 SPRAYS IN NOSTRIL(S) EVERY DAY FOR NASAL ALLERGIES   hydroxychloroquine (PLAQUENIL) 200 mg tablet  Self Yes No   Sig: Take 200 mg by mouth 2 (two) times a day with meals   loratadine (CLARITIN) 10 mg tablet   Yes No   Sig: TAKE ONE TABLET BY MOUTH EVERY MORNING FOR ALLERGIES   losartan (COZAAR) 25 mg tablet  Self Yes No   Sig: Take 12 5 mg by mouth    montelukast (SINGULAIR) 10 mg tablet   Yes No   Sig: Take 10 mg by mouth daily at bedtime   pantoprazole (PROTONIX) 40 mg tablet   Yes No   Sig: Take 40 mg by mouth daily   potassium chloride (K-DUR,KLOR-CON) 10 mEq tablet  Self Yes No   Sig: Take 10 mEq by mouth daily   sildenafil (VIAGRA) 100 mg tablet   Yes No   Sig: TAKE ONE-HALF TABLET BY MOUTH EVERY DAY AS NEEDED PRIOR TO SEXUAL ACTIVITY FOR ERECTILE DYSFUNCTION   tamsulosin (FLOMAX) 0 4 mg   Yes No   Sig: TAKE 1 CAPSULE BY MOUTH EVERY MORNING FOR PROSTATE/NIGHTTIME URINATION (BPH)      Facility-Administered Medications: None       Past Medical History:   Diagnosis Date    Bundle branch block     Frozen shoulder     right    GERD (gastroesophageal reflux disease)     Hypertension     RA (rheumatoid arthritis) (HCC)     Seasonal allergies     Sleep apnea        Past Surgical History:   Procedure Laterality Date    APPENDECTOMY      COLONOSCOPY      EGD      JOINT REPLACEMENT      KNEE ARTHROSCOPY      [place] COLONOSCOPY FLX DX W/COLLJ SPEC WHEN PFRMD N/A 5/2/2019    Procedure: COLONOSCOPY;  Surgeon: Tena Watts MD;  [facility];   Service: Gastroenterology    REPLACEMENT TOTAL KNEE         History reviewed  No pertinent family history  I have reviewed and agree with the history as documented  E-Cigarette/Vaping    E-Cigarette Use Never User      E-Cigarette/Vaping Substances     Social History     Tobacco Use    Smoking status: Former Smoker     Packs/day: 1 50     Types: Cigarettes     Quit date:      Years since quittin 4    Smokeless tobacco: Never Used   Vaping Use    Vaping Use: Never used   Substance Use Topics    Alcohol use: Yes     Comment: social    Drug use: Never        Review of Systems   Constitutional: Negative for chills and fever  HENT: Negative for hearing loss  Eyes: Negative for visual disturbance  Respiratory: Negative for shortness of breath  Cardiovascular: Negative for chest pain  Gastrointestinal: Positive for abdominal pain  Negative for constipation, diarrhea, nausea and vomiting  Genitourinary: Negative for difficulty urinating  Musculoskeletal: Negative for myalgias  Skin: Negative for rash  Neurological: Negative for dizziness  Psychiatric/Behavioral: Negative for agitation  All other systems reviewed and are negative  Physical Exam  ED Triage Vitals [22 1900]   Temperature Pulse Respirations Blood Pressure SpO2   97 9 °F (36 6 °C) (!) 54 16 (!) 187/115 98 %      Temp Source Heart Rate Source Patient Position - Orthostatic VS BP Location FiO2 (%)   Temporal Monitor Sitting Left arm --      Pain Score       4             Orthostatic Vital Signs  Vitals:    22 1900 22 1915 22 1930 22   BP: (!) 187/115 (!) 137/103 (!) 188/93 159/75   Pulse: (!) 54 60 58 (!) 52   Patient Position - Orthostatic VS: Sitting Sitting Sitting Sitting       Physical Exam  Vitals and nursing note reviewed  Constitutional:       General: He is not in acute distress  Appearance: Normal appearance  He is not ill-appearing  HENT:      Head: Normocephalic and atraumatic        Right Ear: External ear normal       Left Ear: External ear normal       Nose: Nose normal       Mouth/Throat:      Mouth: Mucous membranes are moist       Pharynx: Oropharynx is clear  No oropharyngeal exudate  Eyes:      Extraocular Movements: Extraocular movements intact  Conjunctiva/sclera: Conjunctivae normal       Pupils: Pupils are equal, round, and reactive to light  Cardiovascular:      Rate and Rhythm: Normal rate and regular rhythm  Pulses: Normal pulses  Heart sounds: Normal heart sounds  Pulmonary:      Effort: Pulmonary effort is normal  No respiratory distress  Breath sounds: Normal breath sounds  No wheezing  Abdominal:      General: Abdomen is flat  Bowel sounds are normal  There is no distension  Palpations: Abdomen is soft  Tenderness: There is abdominal tenderness in the right upper quadrant and epigastric area  Musculoskeletal:         General: No swelling  Normal range of motion  Cervical back: Normal range of motion  No rigidity  Skin:     General: Skin is warm and dry  Capillary Refill: Capillary refill takes less than 2 seconds  Neurological:      General: No focal deficit present  Mental Status: He is alert and oriented to person, place, and time  Mental status is at baseline  Cranial Nerves: No cranial nerve deficit  Motor: No weakness        Gait: Gait normal    Psychiatric:         Mood and Affect: Mood normal          Behavior: Behavior normal          ED Medications  Medications   ketorolac (TORADOL) injection 15 mg (15 mg Intravenous Given 6/6/22 1951)       Diagnostic Studies  Results Reviewed     Procedure Component Value Units Date/Time    HS Troponin 0hr (reflex protocol) [[id number]]  (Normal) Collected: 06/06/22 1951    Lab Status: Final result Specimen: Blood from Arm, Right Updated: 06/06/22 2028     hs TnI 0hr 30 ng/L     HS Troponin I 2hr [[id number]]     Lab Status: No result Specimen: Blood     HS Troponin I 4hr [[id number]]     Lab Status: No result Specimen: Blood     Comprehensive metabolic panel [[id number]] Collected: 06/06/22 1951    Lab Status: Final result Specimen: Blood from Arm, Right Updated: 06/06/22 2021     Sodium 138 mmol/L      Potassium 3 9 mmol/L      Chloride 104 mmol/L      CO2 28 mmol/L      ANION GAP 6 mmol/L      BUN 22 mg/dL      Creatinine 1 14 mg/dL      Glucose 113 mg/dL      Calcium 9 6 mg/dL      AST 27 U/L      ALT 36 U/L      Alkaline Phosphatase 60 U/L      Total Protein 7 6 g/dL      Albumin 3 8 g/dL      Total Bilirubin 0 78 mg/dL      eGFR 66 ml/min/1 73sq m     Narrative:      [place] guidelines for Chronic Kidney Disease (CKD):     Stage 1 with normal or high GFR (GFR > 90 mL/min/1 73 square meters)    Stage 2 Mild CKD (GFR = 60-89 mL/min/1 73 square meters)    Stage 3A Moderate CKD (GFR = 45-59 mL/min/1 73 square meters)    Stage 3B Moderate CKD (GFR = 30-44 mL/min/1 73 square meters)    Stage 4 Severe CKD (GFR = 15-29 mL/min/1 73 square meters)    Stage 5 End Stage CKD (GFR <15 mL/min/1 73 square meters)  Note: GFR calculation is accurate only with a steady state creatinine    Lipase [[id number]]  (Normal) Collected: 06/06/22 1951    Lab Status: Final result Specimen: Blood from Arm, Right Updated: 06/06/22 2021     Lipase 162 u/L     CBC and differential [[id number]]  (Abnormal) Collected: 06/06/22 1951    Lab Status: Final result Specimen: Blood from Arm, Right Updated: 06/06/22 2001     WBC 11 01 Thousand/uL      RBC 4 72 Million/uL      Hemoglobin 14 8 g/dL      Hematocrit 45 6 %      MCV 97 fL      MCH 31 4 pg      MCHC 32 5 g/dL      RDW 13 6 %      MPV 9 1 fL      Platelets 391 Thousands/uL      nRBC 0 /100 WBCs      Neutrophils Relative 75 %      Immat GRANS % 1 %      Lymphocytes Relative 15 %      Monocytes Relative 7 %      Eosinophils Relative 1 %      Basophils Relative 1 %      Neutrophils Absolute 8 23 Thousands/µL      Immature Grans Absolute 0 08 Thousand/uL      Lymphocytes Absolute 1 70 Thousands/µL      Monocytes Absolute 0 82 Thousand/µL      Eosinophils Absolute 0 12 Thousand/µL      Basophils Absolute 0 06 Thousands/µL                  US right upper quadrant   Final Result by Amara Ram MD (06/06 2140)      Cholelithiasis  No evidence of acute cholecystitis  Negative sonographic Lyles's sign  Workstation performed: [id number]               Procedures  POC Biliary US    Date/Time: 6/6/2022 8:07 PM  Performed by: Sydni Frazier MD  Authorized by: Sydni Frazier MD     Patient location:  ED  Performed by:  Resident  Other Assisting Provider: No    Procedure details:     Exam Type:  Diagnostic    Indications: upper right quadrant abdominal pain      Assessment for:  Cholelithiasis    Views obtained: gallbladder (transverse and longitudinal)      Image quality: limited diagnostic      Image availability:  Images available in PACS and video obtained  Findings:     Cholelithiasis: identified      Common bile duct:  Unable to visualize    Gallbladder wall:  Normal    Gallbladder wall thickness (mm):  3    Pericholecystic fluid: not identified      Sonographic Lyles's sign: positive      Polyps: not identified      Mass: not identified    Interpretation:     Biliary ultrasound impressions: cholelithiasis            ED Course  ED Course as of 06/07/22 0013   Mon Jun 06, 2022 2008 POCUS shows gallstones  Will place for comprehensive RUQ ultrasounds                                       MDM  Number of Diagnoses or Management Options  Biliary colic  Cholelithiasis  Diagnosis management comments: 31-year-old male presenting to ED today for epigastric pain  At this time like to evaluate with a CBC, CMP, lipase, troponin, 12 lead EKG  Point of care ultrasound done by me did reveal cholelithiasis  See procedure note for details  After this finding I did place a formal right upper quadrant ultrasound    This was found to have cholelithiasis but without evidence of gallbladder disease at this time  Lab work grossly unremarkable  I discussed with the patient that I would give him follow-up information with General surgery so that they can discuss treatment options as an outpatient  Patient did have relief of his pain with Toradol  Encouraged to use ibuprofen as an outpatient as he may be having biliary colic and this is a good treatment for colicky type pain  Strict return to ER precautions given the patient was discharged home  Disposition  Final diagnoses:   Cholelithiasis   Biliary colic     Time reflects when diagnosis was documented in both MDM as applicable and the Disposition within this note     Time User Action Codes Description Comment    6/6/2022 10:02 PM Domingo Miner Add [K80 20] Cholelithiasis     6/6/2022 10:02 PM Domingo Miner Add [Z08 55] Biliary colic       ED Disposition     ED Disposition   Discharge    Condition   Stable    Date/Time   Mon Jun 6, 2022 10:01 PM    Comment   Thiago Landry discharge to home/self care                 Follow-up Information     Follow up With Specialties Details Why Contact Info Additional [address] General Surgery Schedule an appointment as soon as possible for a visit in 2 days for follow up [address] [place] [id number]  [facility], [address] [place] [address], [address] [facility] Emergency Department Emergency Medicine Go to  If symptoms worsen, As needed [address] Emergency Department, [address] [place], [place], [address]          Discharge Medication List as of 6/6/2022 10:03 PM      CONTINUE these medications which have NOT CHANGED    Details   albuterol (Ventolin HFA) 90 mcg/act inhaler Inhale 2 puffs every 6 (six) hours as needed for wheezing, Starting Thu 1/21/2021, Normal      Alirocumab 75 MG/ML SOAJ INJECT 75MG (ONE PEN-FUL) SUBCUTANEOUSLY EVERY 2 WEEKS, Historical Med      Cetirizine HCl (ZYRTEC ALLERGY) 10 MG CAPS Take 1 capsule by mouth daily, Historical Med      cholecalciferol (VITAMIN D3) 1,000 units tablet Take 1,000 Units by mouth daily, Historical Med      Cinnamon 500 MG capsule Take 1,000 mg by mouth daily, Historical Med      ezetimibe (ZETIA) 10 mg tablet TAKE ONE-HALF TABLET BY MOUTH 30 MINUTES PRIOR TO BREAKFAST FOR CHOLESTEROL, Historical Med      fluticasone (FLONASE) 50 mcg/act nasal spray INSTILL 2 SPRAYS IN NOSTRIL(S) EVERY DAY FOR NASAL ALLERGIES, Historical Med      hydroxychloroquine (PLAQUENIL) 200 mg tablet Take 200 mg by mouth 2 (two) times a day with meals, Historical Med      loratadine (CLARITIN) 10 mg tablet TAKE ONE TABLET BY MOUTH EVERY MORNING FOR ALLERGIES, Historical Med      losartan (COZAAR) 25 mg tablet Take 12 5 mg by mouth , Historical Med      MAGNESIUM PO Take 1 tablet by mouth daily, Historical Med      montelukast (SINGULAIR) 10 mg tablet Take 10 mg by mouth daily at bedtime, Historical Med      Na Sulfate-K Sulfate-Mg Sulf 17 5-3 13-1 6 GM/177ML SOLN As directed, Sample      OLIVE LEAF EXTRACT PO Take 1 capsule by mouth daily, Historical Med      Omeprazole 20 MG TBEC TAKE ONE CAPSULE BY MOUTH EVERY DAY FOR STOMACH OR REFLUX (TAKE IN THE MORNING 30 MINUTES PRIOR TO BREAKFAST), Historical Med      pantoprazole (PROTONIX) 40 mg tablet Take 40 mg by mouth daily, Historical Med      potassium chloride (K-DUR,KLOR-CON) 10 mEq tablet Take 10 mEq by mouth daily, Historical Med      sildenafil (VIAGRA) 100 mg tablet TAKE ONE-HALF TABLET BY MOUTH EVERY DAY AS NEEDED PRIOR TO SEXUAL ACTIVITY FOR ERECTILE DYSFUNCTION, Historical Med      tamsulosin (FLOMAX) 0 4 mg TAKE 1 CAPSULE BY MOUTH EVERY MORNING FOR PROSTATE/NIGHTTIME URINATION (BPH), Historical Med           No discharge procedures on file      PDMP Review None           ED Provider  Attending physically available and evaluated Author Rolanda DUMONT managed the patient along with the ED Attending      Electronically Signed by         Emily Elias MD  06/07/22 7403

## 2022-06-07 NOTE — DISCHARGE INSTRUCTIONS
You were seen in the ED today for your symptoms  At this time we think you are having biliary colic  You can take tylenol and ibuprofen  Follow up with General Surgery to discuss treatment options  Return to the ED if you have any worsening symptoms or pain

## 2022-06-08 LAB
ATRIAL RATE: 55 BPM
P AXIS: 52 DEGREES
PR INTERVAL: 168 MS
QRS AXIS: -46 DEGREES
QRSD INTERVAL: 150 MS
QT INTERVAL: 458 MS
QTC INTERVAL: 438 MS
T WAVE AXIS: -3 DEGREES
VENTRICULAR RATE: 55 BPM

## 2022-06-08 PROCEDURE — 93010 ELECTROCARDIOGRAM REPORT: CPT | Performed by: INTERNAL MEDICINE

## 2022-06-23 ENCOUNTER — TELEPHONE (OUTPATIENT)
Dept: SURGERY | Facility: CLINIC | Age: 67
End: 2022-06-23

## 2022-06-23 NOTE — TELEPHONE ENCOUNTER
Patient called to make an appointment for gall bladder consult  He has Sami Donald though I didn't see if the [place] referral was received  Please enter or inform when it is entered and call patient for an appointment

## 2022-07-21 ENCOUNTER — CONSULT (OUTPATIENT)
Dept: SURGERY | Facility: CLINIC | Age: 67
End: 2022-07-21
Payer: COMMERCIAL

## 2022-07-21 VITALS
BODY MASS INDEX: 32.41 KG/M2 | HEART RATE: 55 BPM | TEMPERATURE: 97.2 F | DIASTOLIC BLOOD PRESSURE: 78 MMHG | HEIGHT: 71 IN | SYSTOLIC BLOOD PRESSURE: 123 MMHG | WEIGHT: 231.5 LBS

## 2022-07-21 DIAGNOSIS — K80.20 CALCULUS OF GALLBLADDER WITHOUT CHOLECYSTITIS WITHOUT OBSTRUCTION: Primary | ICD-10-CM

## 2022-07-21 PROCEDURE — 99203 OFFICE O/P NEW LOW 30 MIN: CPT | Performed by: SURGERY

## 2022-07-21 NOTE — PROGRESS NOTES
Office Visit - General Surgery  Oneyda Pereira MRN: [id number]  Encounter: [id number]    Assessment and Plan  Problem List Items Addressed This Visit        Digestive    Calculus of gallbladder without cholecystitis without obstruction - Primary     - pt with biliary colic  Explained that pt's episodes of epigastric/RUQ pain are likely secondary to biliary colic given history and symptoms described  Discussed option of elective cholecystectomy with patient  Pt states he would like to go home and think about it prior to scheduling surgery  - pt will call the office with decision  Chief Complaint:  Oneyda Pereira is a 78 y o  male who presents for Cholelithiasis (Consult gallbladder )    Subjective  77 yo male with history of RA and hypercholesterolemia who presents for evaluation for cholecystectomy  Pt reports multiple episodes of RUQ abd pain that radiated to the back  No nausea/vomiting  No fevers  Pt was prescribed omeprazole without any relief of symptoms  He presented to ED for pain and was found to have cholelithiasis on RUQ US  Pt has since changed his diet and has not had any more episodes of pain  Past Medical History:   Diagnosis Date    Bundle branch block     Frozen shoulder     right    GERD (gastroesophageal reflux disease)     Hypertension     RA (rheumatoid arthritis) ([address] )     Seasonal allergies     Sleep apnea        Past Surgical History:   Procedure Laterality Date    APPENDECTOMY      COLONOSCOPY      EGD      JOINT REPLACEMENT      KNEE ARTHROSCOPY      [place] COLONOSCOPY FLX DX W/COLLJ SPEC WHEN PFRMD N/A 2019    Procedure: COLONOSCOPY;  Surgeon: Samia Miles MD;  [facility]; Service: Gastroenterology    REPLACEMENT TOTAL KNEE         History reviewed  No pertinent family history      Social History     Tobacco Use    Smoking status: Former Smoker     Packs/day: 1 50     Types: Cigarettes     Quit date:      Years since quittin 5    Smokeless tobacco: Never Used   Vaping Use    Vaping Use: Never used   Substance Use Topics    Alcohol use: Yes     Comment: social    Drug use: Never        Medications  Current Outpatient Medications on File Prior to Visit   Medication Sig Dispense Refill    albuterol (Ventolin HFA) 90 mcg/act inhaler Inhale 2 puffs every 6 (six) hours as needed for wheezing 18 g 0    Alirocumab 75 MG/ML SOAJ INJECT 75MG (ONE PEN-FUL) SUBCUTANEOUSLY EVERY 2 WEEKS      Cetirizine HCl 10 MG CAPS Take 1 capsule by mouth daily      cholecalciferol (VITAMIN D3) 1,000 units tablet Take 1,000 Units by mouth daily      Cinnamon 500 MG capsule Take 1,000 mg by mouth daily      ezetimibe (ZETIA) 10 mg tablet TAKE ONE-HALF TABLET BY MOUTH 30 MINUTES PRIOR TO BREAKFAST FOR CHOLESTEROL      fluticasone (FLONASE) 50 mcg/act nasal spray INSTILL 2 SPRAYS IN NOSTRIL(S) EVERY DAY FOR NASAL ALLERGIES      hydroxychloroquine (PLAQUENIL) 200 mg tablet Take 200 mg by mouth 2 (two) times a day with meals      loratadine (CLARITIN) 10 mg tablet TAKE ONE TABLET BY MOUTH EVERY MORNING FOR ALLERGIES      losartan (COZAAR) 25 mg tablet Take 12 5 mg by mouth       MAGNESIUM PO Take 1 tablet by mouth daily      montelukast (SINGULAIR) 10 mg tablet Take 10 mg by mouth daily at bedtime      Na Sulfate-K Sulfate-Mg Sulf 17 5-3 13-1 6 GM/177ML SOLN As directed 1 Bottle 0    OLIVE LEAF EXTRACT PO Take 1 capsule by mouth daily      Omeprazole 20 MG TBEC TAKE ONE CAPSULE BY MOUTH EVERY DAY FOR STOMACH OR REFLUX (TAKE IN THE MORNING 30 MINUTES PRIOR TO BREAKFAST)      pantoprazole (PROTONIX) 40 mg tablet Take 40 mg by mouth daily      potassium chloride (K-DUR,KLOR-CON) 10 mEq tablet Take 10 mEq by mouth daily      sildenafil (VIAGRA) 100 mg tablet TAKE ONE-HALF TABLET BY MOUTH EVERY DAY AS NEEDED PRIOR TO SEXUAL ACTIVITY FOR ERECTILE DYSFUNCTION      tamsulosin (FLOMAX) 0 4 mg TAKE 1 CAPSULE BY MOUTH EVERY MORNING FOR PROSTATE/NIGHTTIME URINATION (BPH)       No current facility-administered medications on file prior to visit  Allergies  Allergies   Allergen Reactions    Atorvastatin Myalgia    Colestipol Myalgia    Crestor  [Rosuvastatin Calcium] Myalgia    Gadolinium Other (See Comments)    Monascus Purpureus Went Yeast Myalgia    Statins Myalgia       Review of Systems   Constitutional: Negative  Respiratory: Negative  Cardiovascular: Negative  Gastrointestinal: Negative  Skin: Negative  All other systems reviewed and are negative  Objective  Vitals:    07/21/22 1050   BP: 123/78   Pulse: 55   Temp: (!) 97 2 °F (36 2 °C)       Physical Exam  Vitals reviewed  Constitutional:       Appearance: Normal appearance  He is normal weight  Cardiovascular:      Rate and Rhythm: Normal rate  Pulmonary:      Effort: Pulmonary effort is normal    Abdominal:      General: There is no distension  Palpations: Abdomen is soft  Tenderness: There is no abdominal tenderness  There is no guarding or rebound  Skin:     General: Skin is warm and dry  Neurological:      Mental Status: He is alert

## 2022-07-21 NOTE — H&P (VIEW-ONLY)
Office Visit - General Surgery  Adelaide Harrington MRN: [id number]  Encounter: [id number]    Assessment and Plan  Problem List Items Addressed This Visit        Digestive    Calculus of gallbladder without cholecystitis without obstruction - Primary     - pt with biliary colic  Explained that pt's episodes of epigastric/RUQ pain are likely secondary to biliary colic given history and symptoms described  Discussed option of elective cholecystectomy with patient  Pt states he would like to go home and think about it prior to scheduling surgery  - pt will call the office with decision  Chief Complaint:  Adelaide Harrington is a 78 y o  male who presents for Cholelithiasis (Consult gallbladder )    Subjective  79 yo male with history of RA and hypercholesterolemia who presents for evaluation for cholecystectomy  Pt reports multiple episodes of RUQ abd pain that radiated to the back  No nausea/vomiting  No fevers  Pt was prescribed omeprazole without any relief of symptoms  He presented to ED for pain and was found to have cholelithiasis on RUQ US  Pt has since changed his diet and has not had any more episodes of pain  Past Medical History:   Diagnosis Date    Bundle branch block     Frozen shoulder     right    GERD (gastroesophageal reflux disease)     Hypertension     RA (rheumatoid arthritis) ([address] )     Seasonal allergies     Sleep apnea        Past Surgical History:   Procedure Laterality Date    APPENDECTOMY      COLONOSCOPY      EGD      JOINT REPLACEMENT      KNEE ARTHROSCOPY      CA COLONOSCOPY FLX DX W/COLLJ SPEC WHEN PFRMD N/A 2019    Procedure: COLONOSCOPY;  Surgeon: Kirsty Art MD;  [facility]; Service: Gastroenterology    REPLACEMENT TOTAL KNEE         History reviewed  No pertinent family history      Social History     Tobacco Use    Smoking status: Former Smoker     Packs/day: 1 50     Types: Cigarettes     Quit date:      Years since quittin 5    Smokeless tobacco: Never Used   Vaping Use    Vaping Use: Never used   Substance Use Topics    Alcohol use: Yes     Comment: social    Drug use: Never        Medications  Current Outpatient Medications on File Prior to Visit   Medication Sig Dispense Refill    albuterol (Ventolin HFA) 90 mcg/act inhaler Inhale 2 puffs every 6 (six) hours as needed for wheezing 18 g 0    Alirocumab 75 MG/ML SOAJ INJECT 75MG (ONE PEN-FUL) SUBCUTANEOUSLY EVERY 2 WEEKS      Cetirizine HCl 10 MG CAPS Take 1 capsule by mouth daily      cholecalciferol (VITAMIN D3) 1,000 units tablet Take 1,000 Units by mouth daily      Cinnamon 500 MG capsule Take 1,000 mg by mouth daily      ezetimibe (ZETIA) 10 mg tablet TAKE ONE-HALF TABLET BY MOUTH 30 MINUTES PRIOR TO BREAKFAST FOR CHOLESTEROL      fluticasone (FLONASE) 50 mcg/act nasal spray INSTILL 2 SPRAYS IN NOSTRIL(S) EVERY DAY FOR NASAL ALLERGIES      hydroxychloroquine (PLAQUENIL) 200 mg tablet Take 200 mg by mouth 2 (two) times a day with meals      loratadine (CLARITIN) 10 mg tablet TAKE ONE TABLET BY MOUTH EVERY MORNING FOR ALLERGIES      losartan (COZAAR) 25 mg tablet Take 12 5 mg by mouth       MAGNESIUM PO Take 1 tablet by mouth daily      montelukast (SINGULAIR) 10 mg tablet Take 10 mg by mouth daily at bedtime      Na Sulfate-K Sulfate-Mg Sulf 17 5-3 13-1 6 GM/177ML SOLN As directed 1 Bottle 0    OLIVE LEAF EXTRACT PO Take 1 capsule by mouth daily      Omeprazole 20 MG TBEC TAKE ONE CAPSULE BY MOUTH EVERY DAY FOR STOMACH OR REFLUX (TAKE IN THE MORNING 30 MINUTES PRIOR TO BREAKFAST)      pantoprazole (PROTONIX) 40 mg tablet Take 40 mg by mouth daily      potassium chloride (K-DUR,KLOR-CON) 10 mEq tablet Take 10 mEq by mouth daily      sildenafil (VIAGRA) 100 mg tablet TAKE ONE-HALF TABLET BY MOUTH EVERY DAY AS NEEDED PRIOR TO SEXUAL ACTIVITY FOR ERECTILE DYSFUNCTION      tamsulosin (FLOMAX) 0 4 mg TAKE 1 CAPSULE BY MOUTH EVERY MORNING FOR PROSTATE/NIGHTTIME URINATION (BPH)       No current facility-administered medications on file prior to visit  Allergies  Allergies   Allergen Reactions    Atorvastatin Myalgia    Colestipol Myalgia    Crestor  [Rosuvastatin Calcium] Myalgia    Gadolinium Other (See Comments)    Monascus Purpureus Went Yeast Myalgia    Statins Myalgia       Review of Systems   Constitutional: Negative  Respiratory: Negative  Cardiovascular: Negative  Gastrointestinal: Negative  Skin: Negative  All other systems reviewed and are negative  Objective  Vitals:    07/21/22 1050   BP: 123/78   Pulse: 55   Temp: (!) 97 2 °F (36 2 °C)       Physical Exam  Vitals reviewed  Constitutional:       Appearance: Normal appearance  He is normal weight  Cardiovascular:      Rate and Rhythm: Normal rate  Pulmonary:      Effort: Pulmonary effort is normal    Abdominal:      General: There is no distension  Palpations: Abdomen is soft  Tenderness: There is no abdominal tenderness  There is no guarding or rebound  Skin:     General: Skin is warm and dry  Neurological:      Mental Status: He is alert

## 2022-07-22 PROBLEM — K80.20 CALCULUS OF GALLBLADDER WITHOUT CHOLECYSTITIS WITHOUT OBSTRUCTION: Status: ACTIVE | Noted: 2022-07-22

## 2022-07-22 NOTE — ASSESSMENT & PLAN NOTE
- pt with biliary colic  Explained that pt's episodes of epigastric/RUQ pain are likely secondary to biliary colic given history and symptoms described  Discussed option of elective cholecystectomy with patient  Pt states he would like to go home and think about it prior to scheduling surgery  - pt will call the office with decision  If pt decides on surgery, he will need medical optimization prior to surgery  This was also discussed with patient

## 2022-08-15 RX ORDER — LANOLIN ALCOHOL/MO/W.PET/CERES
CREAM (GRAM) TOPICAL DAILY
COMMUNITY

## 2022-08-15 RX ORDER — B-COMPLEX WITH VITAMIN C
TABLET ORAL DAILY
COMMUNITY

## 2022-08-15 RX ORDER — MULTIVITAMIN
1 CAPSULE ORAL DAILY
COMMUNITY

## 2022-08-15 NOTE — PRE-PROCEDURE INSTRUCTIONS
Pre-Surgery Instructions:   Medication Instructions    albuterol (Ventolin HFA) 90 mcg/act inhaler Uses PRN- OK to take day of surgery    Alirocumab 75 MG/ML SOAJ Hold day of surgery   cholecalciferol (VITAMIN D3) 1,000 units tablet Stop taking 7 days prior to surgery   ezetimibe (ZETIA) 10 mg tablet Take night before surgery    hydroxychloroquine (PLAQUENIL) 200 mg tablet Take day of surgery   losartan (COZAAR) 25 mg tablet Take night before surgery    Multiple Vitamin (multivitamin) capsule Stop taking 7 days prior to surgery   Omeprazole 20 MG TBEC Take day of surgery   vitamin B-12 (VITAMIN B-12) 1,000 mcg tablet Stop taking 7 days prior to surgery   Zinc 100 MG TABS Stop taking 7 days prior to surgery  Pt denies fever, sob, sore throat and cough  Pt verbalized understanding of shower and med instructions  Pt instructed to stop nsaids and supplements one week prior to surgery

## 2022-08-16 ENCOUNTER — ANESTHESIA EVENT (OUTPATIENT)
Dept: PERIOP | Facility: HOSPITAL | Age: 67
End: 2022-08-16
Payer: COMMERCIAL

## 2022-08-17 ENCOUNTER — ANESTHESIA (OUTPATIENT)
Dept: PERIOP | Facility: HOSPITAL | Age: 67
End: 2022-08-17
Payer: COMMERCIAL

## 2022-08-17 ENCOUNTER — HOSPITAL ENCOUNTER (OUTPATIENT)
Facility: HOSPITAL | Age: 67
Setting detail: OUTPATIENT SURGERY
Discharge: HOME | End: 2022-08-17
Attending: SURGERY | Admitting: SURGERY
Payer: COMMERCIAL

## 2022-08-17 VITALS
DIASTOLIC BLOOD PRESSURE: 76 MMHG | SYSTOLIC BLOOD PRESSURE: 159 MMHG | TEMPERATURE: 97.9 F | RESPIRATION RATE: 16 BRPM | BODY MASS INDEX: 32.2 KG/M2 | HEART RATE: 61 BPM | OXYGEN SATURATION: 98 % | HEIGHT: 71 IN | WEIGHT: 230 LBS

## 2022-08-17 DIAGNOSIS — K80.50 BILIARY COLIC: ICD-10-CM

## 2022-08-17 PROCEDURE — 88304 TISSUE EXAM BY PATHOLOGIST: CPT | Performed by: PATHOLOGY

## 2022-08-17 PROCEDURE — 47562 LAPAROSCOPIC CHOLECYSTECTOMY: CPT | Performed by: SURGERY

## 2022-08-17 RX ORDER — LIDOCAINE HYDROCHLORIDE 10 MG/ML
0.5 INJECTION, SOLUTION EPIDURAL; INFILTRATION; INTRACAUDAL; PERINEURAL ONCE AS NEEDED
Status: COMPLETED | OUTPATIENT
Start: 2022-08-17 | End: 2022-08-17

## 2022-08-17 RX ORDER — LIDOCAINE HYDROCHLORIDE 10 MG/ML
INJECTION, SOLUTION EPIDURAL; INFILTRATION; INTRACAUDAL; PERINEURAL AS NEEDED
Status: DISCONTINUED | OUTPATIENT
Start: 2022-08-17 | End: 2022-08-17

## 2022-08-17 RX ORDER — BUPIVACAINE HYDROCHLORIDE 2.5 MG/ML
INJECTION, SOLUTION EPIDURAL; INFILTRATION; INTRACAUDAL AS NEEDED
Status: DISCONTINUED | OUTPATIENT
Start: 2022-08-17 | End: 2022-08-17 | Stop reason: HOSPADM

## 2022-08-17 RX ORDER — MAGNESIUM HYDROXIDE 1200 MG/15ML
LIQUID ORAL AS NEEDED
Status: DISCONTINUED | OUTPATIENT
Start: 2022-08-17 | End: 2022-08-17 | Stop reason: HOSPADM

## 2022-08-17 RX ORDER — CARBOXYMETHYLCELLULOSE SODIUM 5 MG/ML
1 SOLUTION/ DROPS OPHTHALMIC 3 TIMES DAILY
COMMUNITY
Start: 2022-06-17

## 2022-08-17 RX ORDER — DEXAMETHASONE SODIUM PHOSPHATE 10 MG/ML
INJECTION, SOLUTION INTRAMUSCULAR; INTRAVENOUS AS NEEDED
Status: DISCONTINUED | OUTPATIENT
Start: 2022-08-17 | End: 2022-08-17

## 2022-08-17 RX ORDER — HYDROMORPHONE HCL/PF 1 MG/ML
0.4 SYRINGE (ML) INJECTION
Status: DISCONTINUED | OUTPATIENT
Start: 2022-08-17 | End: 2022-08-17 | Stop reason: HOSPADM

## 2022-08-17 RX ORDER — FENTANYL CITRATE/PF 50 MCG/ML
25 SYRINGE (ML) INJECTION
Status: DISCONTINUED | OUTPATIENT
Start: 2022-08-17 | End: 2022-08-17 | Stop reason: HOSPADM

## 2022-08-17 RX ORDER — FENTANYL CITRATE 50 UG/ML
INJECTION, SOLUTION INTRAMUSCULAR; INTRAVENOUS AS NEEDED
Status: DISCONTINUED | OUTPATIENT
Start: 2022-08-17 | End: 2022-08-17

## 2022-08-17 RX ORDER — DIPHENHYDRAMINE HYDROCHLORIDE 50 MG/ML
12.5 INJECTION INTRAMUSCULAR; INTRAVENOUS ONCE AS NEEDED
Status: DISCONTINUED | OUTPATIENT
Start: 2022-08-17 | End: 2022-08-17 | Stop reason: HOSPADM

## 2022-08-17 RX ORDER — ROCURONIUM BROMIDE 10 MG/ML
INJECTION, SOLUTION INTRAVENOUS AS NEEDED
Status: DISCONTINUED | OUTPATIENT
Start: 2022-08-17 | End: 2022-08-17

## 2022-08-17 RX ORDER — CEFAZOLIN SODIUM 1 G/3ML
INJECTION, POWDER, FOR SOLUTION INTRAMUSCULAR; INTRAVENOUS AS NEEDED
Status: DISCONTINUED | OUTPATIENT
Start: 2022-08-17 | End: 2022-08-17

## 2022-08-17 RX ORDER — ONDANSETRON 2 MG/ML
INJECTION INTRAMUSCULAR; INTRAVENOUS AS NEEDED
Status: DISCONTINUED | OUTPATIENT
Start: 2022-08-17 | End: 2022-08-17

## 2022-08-17 RX ORDER — SODIUM CHLORIDE, SODIUM LACTATE, POTASSIUM CHLORIDE, CALCIUM CHLORIDE 600; 310; 30; 20 MG/100ML; MG/100ML; MG/100ML; MG/100ML
125 INJECTION, SOLUTION INTRAVENOUS CONTINUOUS
Status: DISCONTINUED | OUTPATIENT
Start: 2022-08-17 | End: 2022-08-17 | Stop reason: HOSPADM

## 2022-08-17 RX ORDER — PROPOFOL 10 MG/ML
INJECTION, EMULSION INTRAVENOUS AS NEEDED
Status: DISCONTINUED | OUTPATIENT
Start: 2022-08-17 | End: 2022-08-17

## 2022-08-17 RX ORDER — ONDANSETRON 2 MG/ML
4 INJECTION INTRAMUSCULAR; INTRAVENOUS ONCE AS NEEDED
Status: DISCONTINUED | OUTPATIENT
Start: 2022-08-17 | End: 2022-08-17 | Stop reason: HOSPADM

## 2022-08-17 RX ADMIN — DEXAMETHASONE SODIUM PHOSPHATE 10 MG: 10 INJECTION, SOLUTION INTRAMUSCULAR; INTRAVENOUS at 12:16

## 2022-08-17 RX ADMIN — SODIUM CHLORIDE, SODIUM LACTATE, POTASSIUM CHLORIDE, AND CALCIUM CHLORIDE 125 ML/HR: .6; .31; .03; .02 INJECTION, SOLUTION INTRAVENOUS at 10:00

## 2022-08-17 RX ADMIN — ONDANSETRON 4 MG: 2 INJECTION INTRAMUSCULAR; INTRAVENOUS at 12:54

## 2022-08-17 RX ADMIN — FENTANYL CITRATE 50 MCG: 50 INJECTION, SOLUTION INTRAMUSCULAR; INTRAVENOUS at 12:13

## 2022-08-17 RX ADMIN — Medication 25 MCG: at 13:17

## 2022-08-17 RX ADMIN — ROCURONIUM BROMIDE 10 MG: 10 INJECTION, SOLUTION INTRAVENOUS at 12:03

## 2022-08-17 RX ADMIN — CEFAZOLIN SODIUM 2000 MG: 1 INJECTION, POWDER, FOR SOLUTION INTRAMUSCULAR; INTRAVENOUS at 11:21

## 2022-08-17 RX ADMIN — LIDOCAINE HYDROCHLORIDE 100 MG: 10 INJECTION, SOLUTION EPIDURAL; INFILTRATION; INTRACAUDAL; PERINEURAL at 11:12

## 2022-08-17 RX ADMIN — LIDOCAINE HYDROCHLORIDE 0.5 ML: 10 INJECTION, SOLUTION EPIDURAL; INFILTRATION; INTRACAUDAL; PERINEURAL at 10:00

## 2022-08-17 RX ADMIN — Medication 25 MCG: at 13:26

## 2022-08-17 RX ADMIN — ROCURONIUM BROMIDE 50 MG: 10 INJECTION, SOLUTION INTRAVENOUS at 11:12

## 2022-08-17 RX ADMIN — Medication 25 MCG: at 13:22

## 2022-08-17 RX ADMIN — PROPOFOL 200 MG: 10 INJECTION, EMULSION INTRAVENOUS at 11:11

## 2022-08-17 RX ADMIN — FENTANYL CITRATE 100 MCG: 50 INJECTION, SOLUTION INTRAMUSCULAR; INTRAVENOUS at 11:09

## 2022-08-17 RX ADMIN — Medication 25 MCG: at 13:51

## 2022-08-17 NOTE — DISCHARGE INSTRUCTIONS
No dressing is necessary because your skin is skin sealed with surgical glue  You may shower starting 24 hours after your surgery  Do not scrub the incision, gently wash with soap and water and rinse  Do not soak the incision including baths, hot tubs, swimming, until your postoperative visit  You may use over-the-counter pain medicines such as Tylenol and ibuprofen as directed by the bottle  It is okay to take Tylenol and ibuprofen concurrently  Do not lift weight greater than 20 lb until  your postoperative visit  Schedule a postoperative visit with your surgeon for 2 weeks from date of surgery

## 2022-08-17 NOTE — OP NOTE
OPERATIVE REPORT  PATIENT NAME: Cal Soliz    :  1955  MRN: [id number]  Pt Location: [facility] OR ROOM 03    SURGERY DATE: 2022    Surgeon(s) and Role:     Ioana Franco, DO - Primary     * Ludin Wolfe MD - Assisting     * Quin Gary MD - Assisting    Preop Diagnosis:  Biliary colic [C55 67]    Post-Op Diagnosis Codes:     * Biliary colic [G76 44]    Procedure(s) (LRB):  CHOLECYSTECTOMY LAPAROSCOPIC (N/A)    Specimen(s):  ID Type Source Tests Collected by Time Destination   1 : Gallbladder  Tissue Gallbladder TISSUE EXAM Shivani Yuen Salvador, DO 2022 1234        Estimated Blood Loss:   Minimal    Drains:  * No LDAs found *    Anesthesia Type:   General    Operative Indications:  Biliary colic [Y00 91]    Operative Findings:  Non-inflamed gallbladder    Complications:   None    Procedure and Technique:  Patient was brought to the operating room and placed on the operating room table, secured with a safety strap  Patient was properly identified by name, birth date, hospital wrist band  Anesthesia was induced and the airway was secured  Patient was prepped and draped in the usual sterile fashion  Time-out called per protocol  Kwame technique was used to enter the abdomen via a horizontal infraumbilical incision  Vicryl stay sutures her placed through the fascia prior to inserting the 11 mm trocar  Abdomen was insufflated without difficulty  Inspection of the abdominal cavity with camera revealed no obvious injury from entry  The entire abdomen was surveyed, o obvious abnormalities were seen  An 11 mm subxiphoid port was placed under direct visualization after making a transverse incision  Two right upper quadrant subcostal 5 mm ports were similarly placed under direct visualization  Local anesthetic was used prior to insertion of all port sites  Part of the omentum was adhesed to the anterior portion of the liver, this was easily taken down with Bovie hook electrocautery  After taking down the omentum, gallbladder was visualized  It did not appear inflamed  The gallbladder was grasped with a grasper and retracted cephalad  Some and the omental adhesions were peeled down off of the gallbladder  The peritoneum overlying the gallbladder was entered the at the infundibulum and released from both the medial and lateral aspects of the gallbladder  The gallbladder was grasped at the infundibulum for traction and [place] dissector was used to dissect the cystic plate  Dissection from the medial side on the lateral side was necessary to dissect the cystic plate  Once a window was created, the cystic duct was carefully skeletonized  The cystic artery was similarly identified and carefully skeletonized  At this point, our critical view of safety was obtained  There were 2 and only 2 structures entering the gallbladder and the cystic plate was cleared  10 mm clip was used to clip the bile duct with 2 clips proximally, 1 clip distally  Cystic artery was similarly clipped and the 2 clips proximally and 1 clip distally  Cystic duct and artery were transected with endo-scissors  No significant bleeding was encountered at this point  Examination of both stumps reveal single lumen in each  Bovie hook electrocautery was used to dissect the gallbladder from the cystic plate  Early in this dissection, a posterior branch of the cystic artery was encountered anterior and lateral to the clipped structures  Bleeding was controlled with the application of a single 10 mm clip  Dissection of the gallbladder off the liver was continued  Bile spillage did occur during this process and was suctioned out  One gallstone was removed via grasper and two small gallstones were removed via suction  Otherwise the dissection from the liver was completed uneventfully, although the gallbladder was noted to be intrahepatic at the anterior portion   Hemostasis of the liver bed was obtained with electrocautery  Gallbladder was placed in a 10 mm Endo-Catch bag  Are stump room reexamined, for 2 clips placed on each stump without active bleeding  The right upper quadrant was irrigated with copious normal saline in and suctioned up, and tolerating clear  The abdomen was inspected a laminar no other abnormalities were seen, no areas of bleeding, and no spilled gallstones were seen  Hemostasis was excellent  Ports were removed under direct visualization  The fascia of the infraumbilical incision was closed via the stay suture and placement of 1 additional simple vicryl suture  All skin incisions were closed with 4-0 Monocryl in a subcuticular fashion and dressed with surgical glue  Patient was awakened anesthesia extubated, and taken to to good condition  Dr Franco was present for the entire operation      Patient Disposition:  PACU       SIGNATURE: Consuelo Lang MD  DATE: August 17, 2022  TIME: 1:14 PM

## 2022-08-17 NOTE — ANESTHESIA PREPROCEDURE EVALUATION
Procedure:  CHOLECYSTECTOMY LAPAROSCOPIC (N/A Abdomen)  CHOLECYSTECTOMY (N/A Abdomen)    Relevant Problems   ANESTHESIA (within normal limits)      CARDIO   (+) Essential hypertension      GI/HEPATIC   (+) Gastroesophageal reflux disease without esophagitis      MUSCULOSKELETAL   (+) Rheumatoid arthritis of multiple sites with negative rheumatoid factor (HCC)      NEURO/PSYCH   (+) History of colon polyps      Sinus bradycardia  Non-specific intra-ventricular conduction block  Cannot rule out Septal infarct , age undetermined  Abnormal ECG  Confirmed by Prudence Bragg ([id number]) on 6/8/2022 9:36:50 AM    TTE SUMMARY (2021):     LEFT VENTRICLE:  Systolic function was normal  Ejection fraction was estimated to be 60 %  There were no regional wall motion abnormalities  Doppler parameters were consistent with abnormal left ventricular relaxation (grade 1 diastolic dysfunction)      RIGHT VENTRICLE:  The ventricle was dilated  Systolic function was normal      MITRAL VALVE:  There was trace regurgitation      TRICUSPID VALVE:  There was trace regurgitation  Physical Exam    Airway    Mallampati score: I  TM Distance: >3 FB  Neck ROM: full     Dental   No notable dental hx     Cardiovascular  Rhythm: regular, Rate: normal,     Pulmonary  Breath sounds clear to auscultation,     Other Findings        Anesthesia Plan  ASA Score- 2     Anesthesia Type- general with ASA Monitors  Additional Monitors:   Airway Plan: ETT  Plan Factors-Exercise tolerance (METS): >4 METS  Chart reviewed  EKG reviewed  Existing labs reviewed  Patient summary reviewed  Patient is not a current smoker  Obstructive sleep apnea risk education given perioperatively  Induction- intravenous  Postoperative Plan- Plan for postoperative opioid use  Planned trial extubation    Informed Consent- Anesthetic plan and risks discussed with patient  I personally reviewed this patient with the CRNA   Discussed and agreed on the Anesthesia Plan with the CRNA Dara Soulier

## 2022-08-17 NOTE — ANESTHESIA POSTPROCEDURE EVALUATION
Post-Op Assessment Note    CV Status:  Stable    Pain management: adequate     Mental Status:  Alert and awake   Hydration Status:  Euvolemic   PONV Controlled:  Controlled   Airway Patency:  Patent      Post Op Vitals Reviewed: Yes      Staff: CRNA         No complications documented      BP   165/86   Temp   97 3   Pulse 70   Resp   12   SpO2   96%

## 2022-08-17 NOTE — INTERVAL H&P NOTE
H&P reviewed  After examining the patient I find no changes in the patients condition since the H&P had been written      Vitals:    08/17/22 0908   BP: 122/79   Pulse: (!) 54   Resp: 18   Temp: 97 8 °F (36 6 °C)   SpO2: 94%

## 2022-08-22 ENCOUNTER — APPOINTMENT (EMERGENCY)
Dept: RADIOLOGY | Facility: HOSPITAL | Age: 67
End: 2022-08-22
Payer: COMMERCIAL

## 2022-08-22 ENCOUNTER — HOSPITAL ENCOUNTER (EMERGENCY)
Facility: HOSPITAL | Age: 67
Discharge: HOME | End: 2022-08-23
Attending: EMERGENCY MEDICINE
Payer: COMMERCIAL

## 2022-08-22 VITALS
OXYGEN SATURATION: 100 % | RESPIRATION RATE: 22 BRPM | TEMPERATURE: 98.3 F | HEART RATE: 58 BPM | DIASTOLIC BLOOD PRESSURE: 79 MMHG | SYSTOLIC BLOOD PRESSURE: 170 MMHG

## 2022-08-22 DIAGNOSIS — R10.9 ABDOMINAL PAIN: Primary | ICD-10-CM

## 2022-08-22 LAB
ALBUMIN SERPL BCP-MCNC: 3.6 G/DL (ref 3.5–5)
ALP SERPL-CCNC: 57 U/L (ref 46–116)
ALT SERPL W P-5'-P-CCNC: 36 U/L (ref 12–78)
ANION GAP SERPL CALCULATED.3IONS-SCNC: 5 MMOL/L (ref 4–13)
AST SERPL W P-5'-P-CCNC: 18 U/L (ref 5–45)
BASOPHILS # BLD AUTO: 0.06 THOUSANDS/ΜL (ref 0–0.1)
BASOPHILS NFR BLD AUTO: 1 % (ref 0–1)
BILIRUB SERPL-MCNC: 0.64 MG/DL (ref 0.2–1)
BUN SERPL-MCNC: 24 MG/DL (ref 5–25)
CALCIUM SERPL-MCNC: 9.2 MG/DL (ref 8.3–10.1)
CHLORIDE SERPL-SCNC: 107 MMOL/L (ref 96–108)
CO2 SERPL-SCNC: 26 MMOL/L (ref 21–32)
CREAT SERPL-MCNC: 1.05 MG/DL (ref 0.6–1.3)
EOSINOPHIL # BLD AUTO: 0.17 THOUSAND/ΜL (ref 0–0.61)
EOSINOPHIL NFR BLD AUTO: 2 % (ref 0–6)
ERYTHROCYTE [DISTWIDTH] IN BLOOD BY AUTOMATED COUNT: 13.2 % (ref 11.6–15.1)
GFR SERPL CREATININE-BSD FRML MDRD: 73 ML/MIN/1.73SQ M
GLUCOSE SERPL-MCNC: 119 MG/DL (ref 65–140)
HCT VFR BLD AUTO: 44 % (ref 36.5–49.3)
HGB BLD-MCNC: 14.1 G/DL (ref 12–17)
IMM GRANULOCYTES # BLD AUTO: 0.06 THOUSAND/UL (ref 0–0.2)
IMM GRANULOCYTES NFR BLD AUTO: 1 % (ref 0–2)
LIPASE SERPL-CCNC: 172 U/L (ref 73–393)
LYMPHOCYTES # BLD AUTO: 2.5 THOUSANDS/ΜL (ref 0.6–4.47)
LYMPHOCYTES NFR BLD AUTO: 24 % (ref 14–44)
MCH RBC QN AUTO: 30.8 PG (ref 26.8–34.3)
MCHC RBC AUTO-ENTMCNC: 32 G/DL (ref 31.4–37.4)
MCV RBC AUTO: 96 FL (ref 82–98)
MONOCYTES # BLD AUTO: 0.9 THOUSAND/ΜL (ref 0.17–1.22)
MONOCYTES NFR BLD AUTO: 9 % (ref 4–12)
NEUTROPHILS # BLD AUTO: 6.7 THOUSANDS/ΜL (ref 1.85–7.62)
NEUTS SEG NFR BLD AUTO: 63 % (ref 43–75)
NRBC BLD AUTO-RTO: 0 /100 WBCS
PLATELET # BLD AUTO: 271 THOUSANDS/UL (ref 149–390)
PMV BLD AUTO: 9.1 FL (ref 8.9–12.7)
POTASSIUM SERPL-SCNC: 3.8 MMOL/L (ref 3.5–5.3)
PROT SERPL-MCNC: 7.2 G/DL (ref 6.4–8.4)
RBC # BLD AUTO: 4.58 MILLION/UL (ref 3.88–5.62)
SODIUM SERPL-SCNC: 138 MMOL/L (ref 135–147)
WBC # BLD AUTO: 10.39 THOUSAND/UL (ref 4.31–10.16)

## 2022-08-22 PROCEDURE — G1004 CDSM NDSC: HCPCS

## 2022-08-22 PROCEDURE — 85025 COMPLETE CBC W/AUTO DIFF WBC: CPT | Performed by: EMERGENCY MEDICINE

## 2022-08-22 PROCEDURE — 74177 CT ABD & PELVIS W/CONTRAST: CPT

## 2022-08-22 PROCEDURE — 99284 EMERGENCY DEPT VISIT MOD MDM: CPT

## 2022-08-22 PROCEDURE — 83690 ASSAY OF LIPASE: CPT | Performed by: EMERGENCY MEDICINE

## 2022-08-22 PROCEDURE — 80053 COMPREHEN METABOLIC PANEL: CPT | Performed by: EMERGENCY MEDICINE

## 2022-08-22 PROCEDURE — 36415 COLL VENOUS BLD VENIPUNCTURE: CPT | Performed by: EMERGENCY MEDICINE

## 2022-08-22 PROCEDURE — 99283 EMERGENCY DEPT VISIT LOW MDM: CPT | Performed by: EMERGENCY MEDICINE

## 2022-08-22 RX ORDER — KETOROLAC TROMETHAMINE 30 MG/ML
15 INJECTION, SOLUTION INTRAMUSCULAR; INTRAVENOUS ONCE
Status: DISCONTINUED | OUTPATIENT
Start: 2022-08-22 | End: 2022-08-23 | Stop reason: HOSPADM

## 2022-08-22 RX ADMIN — IOHEXOL 75 ML: 350 INJECTION, SOLUTION INTRAVENOUS at 23:45

## 2022-08-23 NOTE — ED PROVIDER NOTES
History  Chief Complaint   Patient presents with    Abdominal Pain     RUQ pain, started 15 mins ago; had gallbladder removed Wednesday 8/17/22; 9/10 pain     60-year-old male with a past medical history of GERD, hypertension, sleep apnea, and hyperlipidemia presents with abdominal pain  Patient had a cholecystectomy on 08/17 due to biliary colic and cholelithiasis  Patient had some bile spill during the procedure, but it was option back out  Patient was having little pain after the operation  He reports only taking Advil the day of the procedure  His pain overall has been improving over the past few days  Patient states that this evening he was talking on the phone and developed sudden onset right upper quadrant pain he states that it is sharp and stabbing  He reports that the pain feels different than the biliary colic that he was having before the procedure  Patient states that after 15 minutes, he decided to come to the ED for evaluation  He has not taken anything for pain  He tried eating something to see if this would help the pain, but it did not  He notes that the pain is starting to feel better  He has no fever, chills, nausea, vomiting, diarrhea, or constipation  Prior to Admission Medications   Prescriptions Last Dose Informant Patient Reported? Taking?    Alirocumab 75 MG/ML SOAJ   Yes No   Sig: INJECT 75MG (ONE PEN-FUL) SUBCUTANEOUSLY EVERY 2 WEEKS   Multiple Vitamin (multivitamin) capsule   Yes No   Sig: Take 1 capsule by mouth daily   Na Sulfate-K Sulfate-Mg Sulf 17 5-3 13-1 6 GM/177ML SOLN   No No   Sig: As directed   Omeprazole 20 MG TBEC   Yes No   Sig: TAKE ONE CAPSULE BY MOUTH EVERY DAY FOR STOMACH OR REFLUX (TAKE IN THE MORNING 30 MINUTES PRIOR TO BREAKFAST)   Zinc 100 MG TABS   Yes No   Sig: Take by mouth in the morning   albuterol (Ventolin HFA) 90 mcg/act inhaler   No No   Sig: Inhale 2 puffs every 6 (six) hours as needed for wheezing   carboxymethylcellulose (REFRESH PLUS) 0 5 % SOLN   Yes No   Sig: Administer 1 drop to both eyes 3 (three) times a day   cholecalciferol (VITAMIN D3) 1,000 units tablet  Self Yes No   Sig: Take 1,000 Units by mouth daily   ezetimibe (ZETIA) 10 mg tablet   Yes No   Sig: TAKE ONE-HALF TABLET BY MOUTH 30 MINUTES PRIOR TO BREAKFAST FOR CHOLESTEROL   hydroxychloroquine (PLAQUENIL) 200 mg tablet  Self Yes No   Sig: Take 200 mg by mouth 2 (two) times a day with meals   losartan (COZAAR) 25 mg tablet  Self Yes No   Sig: Take 12 5 mg by mouth every evening   sildenafil (VIAGRA) 100 mg tablet   Yes No   Sig: TAKE ONE-HALF TABLET BY MOUTH EVERY DAY AS NEEDED PRIOR TO SEXUAL ACTIVITY FOR ERECTILE DYSFUNCTION   vitamin B-12 (VITAMIN B-12) 1,000 mcg tablet   Yes No   Sig: Take by mouth daily      Facility-Administered Medications: None       Past Medical History:   Diagnosis Date    Bundle branch block     Frozen shoulder     right    GERD (gastroesophageal reflux disease)     Hyperlipidemia     Hypertension     RA (rheumatoid arthritis) (HCC)     Scoliosis     Seasonal allergies     Sleep apnea        Past Surgical History:   Procedure Laterality Date    APPENDECTOMY      COLONOSCOPY      DENTAL IMPLANT      EGD      JOINT REPLACEMENT      KNEE ARTHROSCOPY      MS COLONOSCOPY FLX DX W/COLLJ SPEC WHEN PFRMD N/A 05/02/2019    Procedure: COLONOSCOPY;  Surgeon: Cele Bucio MD;  [facility]; Service: Gastroenterology    MS LAP,CHOLECYSTECTOMY N/A 8/17/2022    Procedure: CHOLECYSTECTOMY LAPAROSCOPIC;  Surgeon: Shivani Franco, DO;  [facility];  Service: General    REPLACEMENT TOTAL KNEE         No family history on file  I have reviewed and agree with the history as documented      E-Cigarette/Vaping    E-Cigarette Use Never User      E-Cigarette/Vaping Substances    Nicotine No     THC No     CBD No     Flavoring No     Other No     Unknown No      Social History     Tobacco Use    Smoking status: Former Smoker Packs/day: 1 50     Types: Cigarettes     Quit date: 12     Years since quittin 6    Smokeless tobacco: Never Used   Vaping Use    Vaping Use: Never used   Substance Use Topics    Alcohol use: Yes     Alcohol/week: 4 0 standard drinks     Types: 2 Glasses of wine, 2 Cans of beer per week    Drug use: Never        Review of Systems   Constitutional: Negative for appetite change, chills, fatigue and fever  HENT: Negative  Eyes: Negative  Respiratory: Negative for cough, chest tightness and shortness of breath  Cardiovascular: Negative for chest pain and palpitations  Gastrointestinal: Positive for abdominal pain  Negative for constipation, diarrhea, nausea and vomiting  Endocrine: Negative  Genitourinary: Negative for difficulty urinating and hematuria  Musculoskeletal: Negative for arthralgias and myalgias  Skin: Negative for pallor and rash  Allergic/Immunologic: Negative  Neurological: Negative for dizziness, weakness, light-headedness and headaches  Hematological: Negative  Physical Exam  ED Triage Vitals [22]   Temperature Pulse Respirations Blood Pressure SpO2   98 3 °F (36 8 °C) 58 22 170/79 100 %      Temp Source Heart Rate Source Patient Position - Orthostatic VS BP Location FiO2 (%)   Oral Monitor Sitting Right arm --      Pain Score       9             Orthostatic Vital Signs  Vitals:    22   BP: 170/79   Pulse: 58   Patient Position - Orthostatic VS: Sitting       Physical Exam  Vitals and nursing note reviewed  Constitutional:       Appearance: Normal appearance  HENT:      Head: Normocephalic and atraumatic  Eyes:      Conjunctiva/sclera: Conjunctivae normal    Cardiovascular:      Rate and Rhythm: Normal rate and regular rhythm  Pulmonary:      Effort: Pulmonary effort is normal    Abdominal:      General: Abdomen is flat  Palpations: Abdomen is soft  Tenderness:  There is abdominal tenderness in the right upper quadrant  There is no guarding or rebound  Musculoskeletal:         General: Normal range of motion  Cervical back: Normal range of motion and neck supple  Skin:     General: Skin is warm and dry  Neurological:      General: No focal deficit present  Mental Status: He is alert and oriented to person, place, and time           ED Medications  Medications   ketorolac (TORADOL) injection 15 mg (15 mg Intravenous Not Given 8/22/22 2245)   iohexol (OMNIPAQUE) 350 MG/ML injection (MULTI-DOSE) 75 mL (75 mL Intravenous Given 8/22/22 2345)       Diagnostic Studies  Results Reviewed     Procedure Component Value Units Date/Time    Comprehensive metabolic panel [[id number]] Collected: 08/22/22 2232    Lab Status: Final result Specimen: Blood from Arm, Left Updated: 08/22/22 2304     Sodium 138 mmol/L      Potassium 3 8 mmol/L      Chloride 107 mmol/L      CO2 26 mmol/L      ANION GAP 5 mmol/L      BUN 24 mg/dL      Creatinine 1 05 mg/dL      Glucose 119 mg/dL      Calcium 9 2 mg/dL      AST 18 U/L      ALT 36 U/L      Alkaline Phosphatase 57 U/L      Total Protein 7 2 g/dL      Albumin 3 6 g/dL      Total Bilirubin 0 64 mg/dL      eGFR 73 ml/min/1 73sq m     Narrative:      [place] guidelines for Chronic Kidney Disease (CKD):     Stage 1 with normal or high GFR (GFR > 90 mL/min/1 73 square meters)    Stage 2 Mild CKD (GFR = 60-89 mL/min/1 73 square meters)    Stage 3A Moderate CKD (GFR = 45-59 mL/min/1 73 square meters)    Stage 3B Moderate CKD (GFR = 30-44 mL/min/1 73 square meters)    Stage 4 Severe CKD (GFR = 15-29 mL/min/1 73 square meters)    Stage 5 End Stage CKD (GFR <15 mL/min/1 73 square meters)  Note: GFR calculation is accurate only with a steady state creatinine    Lipase [[id number]]  (Normal) Collected: 08/22/22 2232    Lab Status: Final result Specimen: Blood from Arm, Left Updated: 08/22/22 2304     Lipase 172 u/L     CBC and differential [[id number]]  (Abnormal) Collected: 08/22/22 2232    Lab Status: Final result Specimen: Blood from Arm, Left Updated: 08/22/22 2242     WBC 10 39 Thousand/uL      RBC 4 58 Million/uL      Hemoglobin 14 1 g/dL      Hematocrit 44 0 %      MCV 96 fL      MCH 30 8 pg      MCHC 32 0 g/dL      RDW 13 2 %      MPV 9 1 fL      Platelets 911 Thousands/uL      nRBC 0 /100 WBCs      Neutrophils Relative 63 %      Immat GRANS % 1 %      Lymphocytes Relative 24 %      Monocytes Relative 9 %      Eosinophils Relative 2 %      Basophils Relative 1 %      Neutrophils Absolute 6 70 Thousands/µL      Immature Grans Absolute 0 06 Thousand/uL      Lymphocytes Absolute 2 50 Thousands/µL      Monocytes Absolute 0 90 Thousand/µL      Eosinophils Absolute 0 17 Thousand/µL      Basophils Absolute 0 06 Thousands/µL                  CT abdomen pelvis with contrast   Final Result by Karan Hodge MD (08/23 0018)      Expected postsurgical changes of recent cholecystectomy  No acute abnormality  Workstation performed: [id number]               Procedures  Procedures      ED Course  ED Course as of 08/23/22 0053   Mon Aug 22, 2022   2324 Pt still pain free                                       MDM  Number of Diagnoses or Management Options  Abdominal pain: established and improving  Diagnosis management comments: 62-year-old male presents with right upper quadrant pain a 5 days after a cholecystectomy  Will check labs and repeat CT abdomen pelvis to look for surgical complications  Will give Toradol and re-evaluate  Tenderness resolved shortly after initial evaluation         Amount and/or Complexity of Data Reviewed  Clinical lab tests: ordered and reviewed  Tests in the radiology section of CPT®: ordered and reviewed  Review and summarize past medical records: yes  Discuss the patient with other providers: yes    Risk of Complications, Morbidity, and/or Mortality  Presenting problems: moderate  Diagnostic procedures: moderate  Management options: moderate    Patient Progress  Patient progress: improved    Patient remained asymptomatic  Suspect that this could have been biliary colic  Recommend follow up with General surgery  Patient has an appointment in a week and a half  Return precautions given  All questions answered  Disposition  Final diagnoses:   Abdominal pain     Time reflects when diagnosis was documented in both MDM as applicable and the Disposition within this note     Time User Action Codes Description Comment    8/23/2022 12:22 AM Huy Nathan Add [R10 9] Abdominal pain       ED Disposition     ED Disposition   Discharge    Condition   Good    Date/Time   Tue Aug 23, 2022 12:22 AM    Comment   Joshua Correa discharge to home/self care  Follow-up Information     Follow up With Specialties Details Why Contact Info    Nadine Franco,  General Surgery   [address]  [phone]            Patient's Medications   Discharge Prescriptions    No medications on file     No discharge procedures on file  PDMP Review     None           ED Provider  Attending physically available and evaluated Joshua Correa I managed the patient along with the ED Attending      Electronically Signed by         Maria Victoria Quiroz DO  08/23/22 6006

## 2022-08-23 NOTE — DISCHARGE INSTRUCTIONS
You have been seen for abdominal pain after your surgery  This could be due to biliary colic  You should return to the ED if you develop high fevers, persistent pain, or other worsening symptoms  Follow up with your surgeon  Use Advil or Motrin for pain

## 2022-08-23 NOTE — ED ATTENDING ATTESTATION
8/22/2022  Cristino DUMONT DO, saw and evaluated the patient  I have discussed the patient with the resident/non-physician practitioner and agree with the resident's/non-physician practitioner's findings, Plan of Care, and MDM as documented in the resident's/non-physician practitioner's note, except where noted  All available labs and Radiology studies were reviewed  I was present for key portions of any procedure(s) performed by the resident/non-physician practitioner and I was immediately available to provide assistance  At this point I agree with the current assessment done in the Emergency Department  I have conducted an independent evaluation of this patient a history and physical is as follows:    Patient is a 81-year-old male, status post laparoscopic cholecystectomy August 17, 9722 for biliary colic  Says he was doing fine after being discharged, then about 9:30 p m  This evening he had the onset of some right upper abdominal pain which is relatively constant, felt somewhat sharp and stabbing  Lasted about 40 minutes and then resolved  There was no chest pain,no shortness of breath, no fever, no chills, no nausea, no vomiting  Currently he is asymptomatic and feels fine  When the patient was assessed by the ED resident, it was earlier while the patient was still having pain and discomfort, she reportedly noted some mild right upper abdominal tenderness  General:  Patient is well-appearing  Head:  Atraumatic  Eyes:  Conjunctiva pink  ENT:  Mucous membranes are moist  Neck:  Supple  Cardiac:  S1-S2, without murmurs  Lungs:  Clear to auscultation bilaterally  Abdomen:  Soft, nontender, normal bowel sounds, no CVA tenderness, no tympany, no rigidity, no guarding, well-healing laparoscopic surgical incisions sites secured with tissue adhesive  No wound dehiscence or drainage    Extremities:  Normal range of motion, no pedal edema or calf asymmetry  Neurologic:  Awake, fluent speech, normal comprehension  AAOx3  Skin:  Pink warm and dry  Psychiatric:  Alert, pleasant, cooperative            ED Course     Labs Reviewed   CBC AND DIFFERENTIAL - Abnormal       Result Value Ref Range Status    WBC 10 39 (*) 4 31 - 10 16 Thousand/uL Final    RBC 4 58  3 88 - 5 62 Million/uL Final    Hemoglobin 14 1  12 0 - 17 0 g/dL Final    Hematocrit 44 0  36 5 - 49 3 % Final    MCV 96  82 - 98 fL Final    MCH 30 8  26 8 - 34 3 pg Final    MCHC 32 0  31 4 - 37 4 g/dL Final    RDW 13 2  11 6 - 15 1 % Final    MPV 9 1  8 9 - 12 7 fL Final    Platelets 437  179 - 390 Thousands/uL Final    nRBC 0  /100 WBCs Final    Neutrophils Relative 63  43 - 75 % Final    Immat GRANS % 1  0 - 2 % Final    Lymphocytes Relative 24  14 - 44 % Final    Monocytes Relative 9  4 - 12 % Final    Eosinophils Relative 2  0 - 6 % Final    Basophils Relative 1  0 - 1 % Final    Neutrophils Absolute 6 70  1 85 - 7 62 Thousands/µL Final    Immature Grans Absolute 0 06  0 00 - 0 20 Thousand/uL Final    Lymphocytes Absolute 2 50  0 60 - 4 47 Thousands/µL Final    Monocytes Absolute 0 90  0 17 - 1 22 Thousand/µL Final    Eosinophils Absolute 0 17  0 00 - 0 61 Thousand/µL Final    Basophils Absolute 0 06  0 00 - 0 10 Thousands/µL Final   LIPASE - Normal    Lipase 172  73 - 393 u/L Final   COMPREHENSIVE METABOLIC PANEL    Sodium 135  135 - 147 mmol/L Final    Potassium 3 8  3 5 - 5 3 mmol/L Final    Chloride 107  96 - 108 mmol/L Final    CO2 26  21 - 32 mmol/L Final    ANION GAP 5  4 - 13 mmol/L Final    BUN 24  5 - 25 mg/dL Final    Creatinine 1 05  0 60 - 1 30 mg/dL Final    Comment: Standardized to IDMS reference method    Glucose 119  65 - 140 mg/dL Final    Comment: If the patient is fasting, the ADA then defines impaired fasting glucose as > 100 mg/dL and diabetes as > or equal to 123 mg/dL  Specimen collection should occur prior to Sulfasalazine administration due to the potential for falsely depressed results   Specimen collection should occur prior to Sulfapyridine administration due to the potential for falsely elevated results  Calcium 9 2  8 3 - 10 1 mg/dL Final    AST 18  5 - 45 U/L Final    Comment: Specimen collection should occur prior to Sulfasalazine administration due to the potential for falsely depressed results  ALT 36  12 - 78 U/L Final    Comment: Specimen collection should occur prior to Sulfasalazine and/or Sulfapyridine administration due to the potential for falsely depressed results  Alkaline Phosphatase 57  46 - 116 U/L Final    Total Protein 7 2  6 4 - 8 4 g/dL Final    Albumin 3 6  3 5 - 5 0 g/dL Final    Total Bilirubin 0 64  0 20 - 1 00 mg/dL Final    Comment: Use of this assay is not recommended for patients undergoing treatment with eltrombopag due to the potential for falsely elevated results  eGFR 73  ml/min/1 73sq m Final    Narrative:     [place] guidelines for Chronic Kidney Disease (CKD):     Stage 1 with normal or high GFR (GFR > 90 mL/min/1 73 square meters)    Stage 2 Mild CKD (GFR = 60-89 mL/min/1 73 square meters)    Stage 3A Moderate CKD (GFR = 45-59 mL/min/1 73 square meters)    Stage 3B Moderate CKD (GFR = 30-44 mL/min/1 73 square meters)    Stage 4 Severe CKD (GFR = 15-29 mL/min/1 73 square meters)    Stage 5 End Stage CKD (GFR <15 mL/min/1 73 square meters)  Note: GFR calculation is accurate only with a steady state creatinine       Pending studies CT abdomen and pelvis  Patient overall well-appearing, symptoms have resolved, currently asymptomatic  Unremarkable labs  If imaging is unremarkable, would discharge patient home with outpatient follow-up and return precautions  I do not believe this patient's complaints are from pulmonary embolism and I believe they would most likely be harmed through false positive test results and other complications of testing by further pursuing the diagnosis of pulmonary embolism    Signed out to Dr Sung Doss Time  Procedures

## 2022-09-06 ENCOUNTER — OFFICE VISIT (OUTPATIENT)
Dept: SURGERY | Facility: CLINIC | Age: 67
End: 2022-09-06

## 2022-09-06 VITALS — TEMPERATURE: 97.6 F | WEIGHT: 226.6 LBS | HEIGHT: 71 IN | BODY MASS INDEX: 31.72 KG/M2

## 2022-09-06 DIAGNOSIS — K80.20 CALCULUS OF GALLBLADDER WITHOUT CHOLECYSTITIS WITHOUT OBSTRUCTION: Primary | ICD-10-CM

## 2022-09-06 PROCEDURE — 99024 POSTOP FOLLOW-UP VISIT: CPT | Performed by: SURGERY

## 2022-09-06 NOTE — PROGRESS NOTES
Office Visit - General Surgery  Jami Taylor MRN: [id number]  Encounter: [id number]    Assessment and Plan  Problem List Items Addressed This Visit        Digestive    Calculus of gallbladder without cholecystitis without obstruction - Primary     S/p lap renato   - path reviewed with patient   - no heavy lifting for 2-4 weeks postop   - f/u prn                Chief Complaint:  Jami Taylor is a 78 y o  male who presents for Post-op (P/o lap renato)    Subjective  79 yo male s/p lap renato, who presents for follow up evaluation  Pain controlled  No f/c/n/v  Tolerating diet  No changes in bowel habits  Past Medical History:   Diagnosis Date    Bundle branch block     Frozen shoulder     right    GERD (gastroesophageal reflux disease)     Hyperlipidemia     Hypertension     RA (rheumatoid arthritis) ([address] )     Scoliosis     Seasonal allergies     Sleep apnea        Past Surgical History:   Procedure Laterality Date    APPENDECTOMY      COLONOSCOPY      DENTAL IMPLANT      EGD      JOINT REPLACEMENT      KNEE ARTHROSCOPY      VT COLONOSCOPY FLX DX W/COLLJ SPEC WHEN PFRMD N/A 2019    Procedure: COLONOSCOPY;  Surgeon: Melinda Bowling MD;  [facility]; Service: Gastroenterology    VT LAP,CHOLECYSTECTOMY N/A 2022    Procedure: CHOLECYSTECTOMY LAPAROSCOPIC;  Surgeon: Brooke Coronado ToDO;  [facility];  Service: General    REPLACEMENT TOTAL KNEE         History reviewed  No pertinent family history  Social History     Tobacco Use    Smoking status: Former Smoker     Packs/day: 1 50     Types: Cigarettes     Quit date:      Years since quittin 7    Smokeless tobacco: Never Used   Vaping Use    Vaping Use: Never used   Substance Use Topics    Alcohol use:  Yes     Alcohol/week: 4 0 standard drinks     Types: 2 Glasses of wine, 2 Cans of beer per week    Drug use: Never        Medications  Current Outpatient Medications on File Prior to Visit   Medication Sig Dispense Refill    albuterol (Ventolin HFA) 90 mcg/act inhaler Inhale 2 puffs every 6 (six) hours as needed for wheezing 18 g 0    Alirocumab 75 MG/ML SOAJ INJECT 75MG (ONE PEN-FUL) SUBCUTANEOUSLY EVERY 2 WEEKS      carboxymethylcellulose (REFRESH PLUS) 0 5 % SOLN Administer 1 drop to both eyes 3 (three) times a day      cholecalciferol (VITAMIN D3) 1,000 units tablet Take 1,000 Units by mouth daily      ezetimibe (ZETIA) 10 mg tablet TAKE ONE-HALF TABLET BY MOUTH 30 MINUTES PRIOR TO BREAKFAST FOR CHOLESTEROL      hydroxychloroquine (PLAQUENIL) 200 mg tablet Take 200 mg by mouth 2 (two) times a day with meals      losartan (COZAAR) 25 mg tablet Take 12 5 mg by mouth every evening      Multiple Vitamin (multivitamin) capsule Take 1 capsule by mouth daily      Na Sulfate-K Sulfate-Mg Sulf 17 5-3 13-1 6 GM/177ML SOLN As directed 1 Bottle 0    Omeprazole 20 MG TBEC TAKE ONE CAPSULE BY MOUTH EVERY DAY FOR STOMACH OR REFLUX (TAKE IN THE MORNING 30 MINUTES PRIOR TO BREAKFAST)      sildenafil (VIAGRA) 100 mg tablet TAKE ONE-HALF TABLET BY MOUTH EVERY DAY AS NEEDED PRIOR TO SEXUAL ACTIVITY FOR ERECTILE DYSFUNCTION      vitamin B-12 (VITAMIN B-12) 1,000 mcg tablet Take by mouth daily      Zinc 100 MG TABS Take by mouth in the morning       No current facility-administered medications on file prior to visit  Allergies  Allergies   Allergen Reactions    Atorvastatin Myalgia    Colestipol Myalgia    Crestor  [Rosuvastatin Calcium] Myalgia    Gadolinium Other (See Comments)    Monascus Purpureus Went Yeast Myalgia    Statins Myalgia       Review of Systems   Constitutional: Negative  Respiratory: Negative  Cardiovascular: Negative  Gastrointestinal: Negative  Skin: Negative  All other systems reviewed and are negative  Objective  Vitals:    09/06/22 1110   Temp: 97 6 °F (36 4 °C)       Physical Exam  Constitutional:       Appearance: Normal appearance  He is normal weight     HENT: Head: Atraumatic  Cardiovascular:      Rate and Rhythm: Normal rate  Pulmonary:      Effort: Pulmonary effort is normal    Abdominal:      General: There is no distension  Palpations: Abdomen is soft  Tenderness: There is no abdominal tenderness  There is no guarding or rebound  Comments: Incisions healing well, no erythema/fluctuance/drainage   Neurological:      Mental Status: He is alert

## 2022-09-09 NOTE — ASSESSMENT & PLAN NOTE
S/p saravanan gross   - path reviewed with patient   - no heavy lifting for 2-4 weeks postop   - f/u prn

## 2022-12-03 ENCOUNTER — HOSPITAL ENCOUNTER (EMERGENCY)
Facility: HOSPITAL | Age: 67
Discharge: HOME | End: 2022-12-04
Attending: EMERGENCY MEDICINE

## 2022-12-03 ENCOUNTER — APPOINTMENT (EMERGENCY)
Dept: RADIOLOGY | Facility: HOSPITAL | Age: 67
End: 2022-12-03
Attending: EMERGENCY MEDICINE

## 2022-12-03 DIAGNOSIS — R07.9 CHEST PAIN: Primary | ICD-10-CM

## 2022-12-03 DIAGNOSIS — R07.89 CHEST WALL PAIN: ICD-10-CM

## 2022-12-03 DIAGNOSIS — R91.1 PULMONARY NODULE: ICD-10-CM

## 2022-12-03 LAB
2HR DELTA HS TROPONIN: -6 NG/L
ALBUMIN SERPL BCP-MCNC: 3.5 G/DL (ref 3.5–5)
ALP SERPL-CCNC: 64 U/L (ref 46–116)
ALT SERPL W P-5'-P-CCNC: 31 U/L (ref 12–78)
ANION GAP SERPL CALCULATED.3IONS-SCNC: 5 MMOL/L (ref 4–13)
AST SERPL W P-5'-P-CCNC: 31 U/L (ref 5–45)
BASOPHILS # BLD AUTO: 0.05 THOUSANDS/ÂΜL (ref 0–0.1)
BASOPHILS NFR BLD AUTO: 1 % (ref 0–1)
BILIRUB SERPL-MCNC: 0.55 MG/DL (ref 0.2–1)
BUN SERPL-MCNC: 18 MG/DL (ref 5–25)
CALCIUM SERPL-MCNC: 9.2 MG/DL (ref 8.3–10.1)
CARDIAC TROPONIN I PNL SERPL HS: 25 NG/L
CARDIAC TROPONIN I PNL SERPL HS: 31 NG/L
CHLORIDE SERPL-SCNC: 108 MMOL/L (ref 96–108)
CO2 SERPL-SCNC: 28 MMOL/L (ref 21–32)
CREAT SERPL-MCNC: 1 MG/DL (ref 0.6–1.3)
D DIMER PPP FEU-MCNC: 0.7 UG/ML FEU
EOSINOPHIL # BLD AUTO: 0.13 THOUSAND/ÂΜL (ref 0–0.61)
EOSINOPHIL NFR BLD AUTO: 2 % (ref 0–6)
ERYTHROCYTE [DISTWIDTH] IN BLOOD BY AUTOMATED COUNT: 13.6 % (ref 11.6–15.1)
GFR SERPL CREATININE-BSD FRML MDRD: 78 ML/MIN/1.73SQ M
GLUCOSE SERPL-MCNC: 89 MG/DL (ref 65–140)
HCT VFR BLD AUTO: 45.4 % (ref 36.5–49.3)
HGB BLD-MCNC: 14.5 G/DL (ref 12–17)
IMM GRANULOCYTES # BLD AUTO: 0.05 THOUSAND/UL (ref 0–0.2)
IMM GRANULOCYTES NFR BLD AUTO: 1 % (ref 0–2)
LYMPHOCYTES # BLD AUTO: 1.84 THOUSANDS/ÂΜL (ref 0.6–4.47)
LYMPHOCYTES NFR BLD AUTO: 24 % (ref 14–44)
MCH RBC QN AUTO: 31 PG (ref 26.8–34.3)
MCHC RBC AUTO-ENTMCNC: 31.9 G/DL (ref 31.4–37.4)
MCV RBC AUTO: 97 FL (ref 82–98)
MONOCYTES # BLD AUTO: 0.69 THOUSAND/ÂΜL (ref 0.17–1.22)
MONOCYTES NFR BLD AUTO: 9 % (ref 4–12)
NEUTROPHILS # BLD AUTO: 4.86 THOUSANDS/ÂΜL (ref 1.85–7.62)
NEUTS SEG NFR BLD AUTO: 63 % (ref 43–75)
NRBC BLD AUTO-RTO: 0 /100 WBCS
PLATELET # BLD AUTO: 301 THOUSANDS/UL (ref 149–390)
PMV BLD AUTO: 9.2 FL (ref 8.9–12.7)
POTASSIUM SERPL-SCNC: 3.9 MMOL/L (ref 3.5–5.3)
PROT SERPL-MCNC: 8 G/DL (ref 6.4–8.4)
RBC # BLD AUTO: 4.67 MILLION/UL (ref 3.88–5.62)
SODIUM SERPL-SCNC: 141 MMOL/L (ref 135–147)
WBC # BLD AUTO: 7.62 THOUSAND/UL (ref 4.31–10.16)

## 2022-12-03 RX ORDER — SODIUM CHLORIDE 9 MG/ML
3 INJECTION INTRAVENOUS
Status: DISCONTINUED | OUTPATIENT
Start: 2022-12-03 | End: 2022-12-04 | Stop reason: HOSPADM

## 2022-12-03 RX ORDER — ACETAMINOPHEN 325 MG/1
975 TABLET ORAL ONCE
Status: COMPLETED | OUTPATIENT
Start: 2022-12-03 | End: 2022-12-03

## 2022-12-03 RX ORDER — LIDOCAINE 50 MG/G
1 PATCH TOPICAL ONCE
Status: DISCONTINUED | OUTPATIENT
Start: 2022-12-03 | End: 2022-12-04 | Stop reason: HOSPADM

## 2022-12-03 RX ADMIN — ACETAMINOPHEN 975 MG: 325 TABLET ORAL at 20:07

## 2022-12-03 RX ADMIN — IOHEXOL 100 ML: 350 INJECTION, SOLUTION INTRAVENOUS at 22:02

## 2022-12-03 RX ADMIN — LIDOCAINE 5% 1 PATCH: 700 PATCH TOPICAL at 20:07

## 2022-12-04 VITALS
RESPIRATION RATE: 20 BRPM | OXYGEN SATURATION: 97 % | HEART RATE: 50 BPM | TEMPERATURE: 97.6 F | SYSTOLIC BLOOD PRESSURE: 146 MMHG | DIASTOLIC BLOOD PRESSURE: 95 MMHG

## 2022-12-04 LAB
ATRIAL RATE: 59 BPM
P AXIS: 57 DEGREES
PR INTERVAL: 162 MS
QRS AXIS: -33 DEGREES
QRSD INTERVAL: 146 MS
QT INTERVAL: 452 MS
QTC INTERVAL: 447 MS
T WAVE AXIS: 0 DEGREES
VENTRICULAR RATE: 59 BPM

## 2022-12-04 NOTE — ED ATTENDING ATTESTATION
12/3/2022  IRenee MD, saw and evaluated the patient  I have discussed the patient with the resident/non-physician practitioner and agree with the resident's/non-physician practitioner's findings, Plan of Care, and MDM as documented in the resident's/non-physician practitioner's note, except where noted  All available labs and Radiology studies were reviewed  I was present for key portions of any procedure(s) performed by the resident/non-physician practitioner and I was immediately available to provide assistance  At this point I agree with the current assessment done in the Emergency Department  I have conducted an independent evaluation of this patient a history and physical is as follows:    ED Course     59-year-old male presenting to the emergency department for evaluation of left-sided anterior chest pain  Patient states that 2 days ago, he was playing volleyball and had landed on his left side on a hard cord  He denies hitting his head denies loss of consciousness  He denies any new neck pain, but states that he has baseline chronic neck pain  Patient states that he was initially sore over the lateral rib margin in the mid axillary line, and states that he had taken some meloxicam that he had left over after knee problems  Patient states that he was seated in a chair watching [place] soccer when he had abrupt onset of anterior sharp chest pain which is pleuritic  No associated diaphoresis, nausea, vomiting  No shortness of breath  Ten systems reviewed and negative except as noted  Patient denies personal or family history of DVT or PE  The patient is resting comfortably on a stretcher in no acute respiratory distress  The patient appears nontoxic  HEENT reveals moist mucous membranes  Head is normocephalic and atraumatic  Conjunctiva and sclera are normal  Neck is nontender and supple with full range of motion to flexion, extension, lateral rotation   No meningismus appreciated  No masses are appreciated  Lungs are clear to auscultation bilaterally without any wheezes, rales or rhonchi  Chest wall is mildly tender to palpation over rib number 8 in the anterior axillary line  There is no subcutaneous emphysema or crepitance appreciated  Heart is regular rate and rhythm without any murmurs, rubs or gallops  Abdomen is soft and nontender without any rebound or guarding  Extremities appear grossly normal without any significant arthropathy  No lower extremity edema  No calf tenderness bilaterally  Negative Homans sign bilaterally  Patient is awake, alert, and oriented x3  The patient has normal interaction  Motor is 5 out of 5  Labs Reviewed   D-DIMER, QUANTITATIVE - Abnormal       Result Value Ref Range Status    D-Dimer, Quant 0 70 (*) <0 50 ug/ml FEU Final    Comment: Reference and upper limits to exclude DVT and PE are the same  Do not use to exclude if clinical symptoms are present  Narrative: In the evaluation for possible pulmonary embolism, in the appropriate (Well's Score of 4 or less) patient, the age adjusted d-dimer cutoff for this patient can be calculated as:    Age x 0 01 (in ug/mL) for Age-adjusted D-dimer exclusion threshold for a patient over 50 years  HS TROPONIN I 0HR - Normal    hs TnI 0hr 31  "Refer to ACS Flowchart"- see link ng/L Final    Comment:                                              Initial (time 0) result  If >=50 ng/L, Myocardial injury suggested ;  Type of myocardial injury and treatment strategy  to be determined  If 5-49 ng/L, a delta result at 2 hours and or 4 hours will be needed to further evaluate  If <4 ng/L, and chest pain has been >3 hours since onset, patient may qualify for discharge based on the HEART score in the ED  If <5 ng/L and <3hours since onset of chest pain, a delta result at 2 hours will be needed to further evaluate      HS Troponin 99th Percentile URL of a Health Population=12 ng/L with a 95% Confidence Interval of 8-18 ng/L  Second Troponin (time 2 hours)  If calculated delta >= 20 ng/L,  Myocardial injury suggested ; Type of myocardial injury and treatment strategy to be determined  If 5-49 ng/L and the calculated delta is 5-19 ng/L, consult medical service for evaluation  Continue evaluation for ischemia on ecg and other possible etiology and repeat hs troponin at 4 hours  If delta is <5 ng/L at 2 hours, consider discharge based on risk stratification via the HEART score (if in ED), or MOO risk score in IP/Observation  HS Troponin 99th Percentile URL of a Health Population=12 ng/L with a 95% Confidence Interval of 8-18 ng/L    HS TROPONIN I 2HR - Normal    hs TnI 2hr 25  "Refer to ACS Flowchart"- see link ng/L Final    Comment:                                              Initial (time 0) result  If >=50 ng/L, Myocardial injury suggested ;  Type of myocardial injury and treatment strategy  to be determined  If 5-49 ng/L, a delta result at 2 hours and or 4 hours will be needed to further evaluate  If <4 ng/L, and chest pain has been >3 hours since onset, patient may qualify for discharge based on the HEART score in the ED  If <5 ng/L and <3hours since onset of chest pain, a delta result at 2 hours will be needed to further evaluate  HS Troponin 99th Percentile URL of a Health Population=12 ng/L with a 95% Confidence Interval of 8-18 ng/L  Second Troponin (time 2 hours)  If calculated delta >= 20 ng/L,  Myocardial injury suggested ; Type of myocardial injury and treatment strategy to be determined  If 5-49 ng/L and the calculated delta is 5-19 ng/L, consult medical service for evaluation  Continue evaluation for ischemia on ecg and other possible etiology and repeat hs troponin at 4 hours  If delta is <5 ng/L at 2 hours, consider discharge based on risk stratification via the HEART score (if in ED), or MOO risk score in IP/Observation      HS Troponin 99th Percentile URL of a Health Population=12 ng/L with a 95% Confidence Interval of 8-18 ng/L  Delta 2hr hsTnI -6  <20 ng/L Final   CBC AND DIFFERENTIAL    WBC 7 62  4 31 - 10 16 Thousand/uL Final    RBC 4 67  3 88 - 5 62 Million/uL Final    Hemoglobin 14 5  12 0 - 17 0 g/dL Final    Hematocrit 45 4  36 5 - 49 3 % Final    MCV 97  82 - 98 fL Final    MCH 31 0  26 8 - 34 3 pg Final    MCHC 31 9  31 4 - 37 4 g/dL Final    RDW 13 6  11 6 - 15 1 % Final    MPV 9 2  8 9 - 12 7 fL Final    Platelets 775  964 - 390 Thousands/uL Final    nRBC 0  /100 WBCs Final    Neutrophils Relative 63  43 - 75 % Final    Immat GRANS % 1  0 - 2 % Final    Lymphocytes Relative 24  14 - 44 % Final    Monocytes Relative 9  4 - 12 % Final    Eosinophils Relative 2  0 - 6 % Final    Basophils Relative 1  0 - 1 % Final    Neutrophils Absolute 4 86  1 85 - 7 62 Thousands/µL Final    Immature Grans Absolute 0 05  0 00 - 0 20 Thousand/uL Final    Lymphocytes Absolute 1 84  0 60 - 4 47 Thousands/µL Final    Monocytes Absolute 0 69  0 17 - 1 22 Thousand/µL Final    Eosinophils Absolute 0 13  0 00 - 0 61 Thousand/µL Final    Basophils Absolute 0 05  0 00 - 0 10 Thousands/µL Final   COMPREHENSIVE METABOLIC PANEL    Sodium 960  135 - 147 mmol/L Final    Potassium 3 9  3 5 - 5 3 mmol/L Final    Chloride 108  96 - 108 mmol/L Final    CO2 28  21 - 32 mmol/L Final    ANION GAP 5  4 - 13 mmol/L Final    BUN 18  5 - 25 mg/dL Final    Creatinine 1 00  0 60 - 1 30 mg/dL Final    Comment: Standardized to IDMS reference method    Glucose 89  65 - 140 mg/dL Final    Comment: If the patient is fasting, the ADA then defines impaired fasting glucose as > 100 mg/dL and diabetes as > or equal to 123 mg/dL  Specimen collection should occur prior to Sulfasalazine administration due to the potential for falsely depressed results  Specimen collection should occur prior to Sulfapyridine administration due to the potential for falsely elevated results      Calcium 9 2  8 3 - 10 1 mg/dL Final AST 31  5 - 45 U/L Final    Comment: Specimen collection should occur prior to Sulfasalazine administration due to the potential for falsely depressed results  ALT 31  12 - 78 U/L Final    Comment: Specimen collection should occur prior to Sulfasalazine and/or Sulfapyridine administration due to the potential for falsely depressed results  Alkaline Phosphatase 64  46 - 116 U/L Final    Total Protein 8 0  6 4 - 8 4 g/dL Final    Albumin 3 5  3 5 - 5 0 g/dL Final    Total Bilirubin 0 55  0 20 - 1 00 mg/dL Final    Comment: Use of this assay is not recommended for patients undergoing treatment with eltrombopag due to the potential for falsely elevated results  eGFR 78  ml/min/1 73sq m Final    Narrative:     [place] guidelines for Chronic Kidney Disease (CKD):   •  Stage 1 with normal or high GFR (GFR > 90 mL/min/1 73 square meters)  •  Stage 2 Mild CKD (GFR = 60-89 mL/min/1 73 square meters)  •  Stage 3A Moderate CKD (GFR = 45-59 mL/min/1 73 square meters)  •  Stage 3B Moderate CKD (GFR = 30-44 mL/min/1 73 square meters)  •  Stage 4 Severe CKD (GFR = 15-29 mL/min/1 73 square meters)  •  Stage 5 End Stage CKD (GFR <15 mL/min/1 73 square meters)  Note: GFR calculation is accurate only with a steady state creatinine       CTA ED chest PE study   Final Result      No evidence for pulmonary embolus  No consolidation or effusion  4 mm subpleural pulmonary nodule right middle lobe  Based on current Fleischner Society 2017 Guidelines on incidental pulmonary nodule, no routine follow-up is needed if the patient is low risk  If the patient is high risk, optional follow-up chest CT    at 12 months can be considered  Study was marked in Epic for significant notification        Workstation performed: [id number]               Critical Care Time  Procedures

## 2022-12-04 NOTE — DISCHARGE INSTRUCTIONS
You were evaluated in the Emergency Department today for chest pain  Your evaluation has shown no signs of medical conditions requiring emergent intervention at this time, however we recommend that you follow up with your primary care physician as soon as possible for further testing as an outpatient  You should see them within the next 2-3 days  While your workup did not have any emergent findings, you do have some risk factors for cardiac disease, and given your chest pain I recommend that you see a cardiologist  I attached information for one  Please call on Monday  Your imaging did show a pulmonary nodule  Please discuss this with your primary care physician  I attached the radiology read here:     4 mm subpleural pulmonary nodule right middle lobe  Based on current Fleischner Society 2017 Guidelines on incidental pulmonary nodule, no routine follow-up is needed if the patient is low risk  If the patient is high risk, optional follow-up chest CT at 12 months can be considered  I prescribed you voltaren gel  You can apply this to the area for pain  It is at your pharmacy  Return to the Emergency Department if you experience worsening or uncontrolled chest pain, shortness of breath, light headedness, feeling faint, nausea, vomiting, or any other concerning symptoms  Thank you for choosing us for your care

## 2022-12-04 NOTE — ED PROVIDER NOTES
History  Chief Complaint   Patient presents with   • Chest Pain     Pt presents ambulatory with c/o L side chest pain, fell on Thursday playing volleyball, took meloxicam which helped at that time took it again today without relief  Patient is a 78year old male with a significant past medical history of hypertension, hyperlipidemia, currently presenting with a chief complaint chest pain  He states that approximately 2 days ago he was playing volleyball and fell and landed on his left chest, experiencing pain in left lateral chest   He took some prescribed meloxicam and had improvement in his symptoms, however today he was sitting and watching TV when he suddenly experienced a sharp, constant, pleuritic, left-sided chest pain  He describes this as different than his initial chest pain, and in a slightly different location  He has not had any diaphoresis, nausea, or lightheadedness associated with it  It does not radiate into his back or his neck  She denies any history of blood clots, denies any hemoptysis, lower extremity swelling, or tenderness  He does state that he was sick with with a unspecified viral like illness approximately 4 weeks ago, which has since resolved  He is denying any fevers or chills  He has never had anything like this before  Prior to Admission Medications   Prescriptions Last Dose Informant Patient Reported? Taking?    Alirocumab 75 MG/ML SOAJ   Yes No   Sig: INJECT 75MG (ONE PEN-FUL) SUBCUTANEOUSLY EVERY 2 WEEKS   Multiple Vitamin (multivitamin) capsule   Yes No   Sig: Take 1 capsule by mouth daily   Na Sulfate-K Sulfate-Mg Sulf 17 5-3 13-1 6 GM/177ML SOLN   No No   Sig: As directed   Omeprazole 20 MG TBEC   Yes No   Sig: TAKE ONE CAPSULE BY MOUTH EVERY DAY FOR STOMACH OR REFLUX (TAKE IN THE MORNING 30 MINUTES PRIOR TO BREAKFAST)   Zinc 100 MG TABS   Yes No   Sig: Take by mouth in the morning   albuterol (Ventolin HFA) 90 mcg/act inhaler   No No   Sig: Inhale 2 puffs every 6 (six) hours as needed for wheezing   carboxymethylcellulose (REFRESH PLUS) 0 5 % SOLN   Yes No   Sig: Administer 1 drop to both eyes 3 (three) times a day   cholecalciferol (VITAMIN D3) 1,000 units tablet   Yes No   Sig: Take 1,000 Units by mouth daily   ezetimibe (ZETIA) 10 mg tablet   Yes No   Sig: TAKE ONE-HALF TABLET BY MOUTH 30 MINUTES PRIOR TO BREAKFAST FOR CHOLESTEROL   hydroxychloroquine (PLAQUENIL) 200 mg tablet   Yes No   Sig: Take 200 mg by mouth 2 (two) times a day with meals   losartan (COZAAR) 25 mg tablet   Yes No   Sig: Take 12 5 mg by mouth every evening   sildenafil (VIAGRA) 100 mg tablet   Yes No   Sig: TAKE ONE-HALF TABLET BY MOUTH EVERY DAY AS NEEDED PRIOR TO SEXUAL ACTIVITY FOR ERECTILE DYSFUNCTION   vitamin B-12 (VITAMIN B-12) 1,000 mcg tablet   Yes No   Sig: Take by mouth daily      Facility-Administered Medications: None       Past Medical History:   Diagnosis Date   • Bundle branch block    • Frozen shoulder     right   • GERD (gastroesophageal reflux disease)    • Hyperlipidemia    • Hypertension    • RA (rheumatoid arthritis) (HCC)    • Scoliosis    • Seasonal allergies    • Sleep apnea        Past Surgical History:   Procedure Laterality Date   • APPENDECTOMY     • COLONOSCOPY     • DENTAL IMPLANT     • EGD     • JOINT REPLACEMENT     • KNEE ARTHROSCOPY     • ME COLONOSCOPY FLX DX W/COLLJ SPEC WHEN PFRMD N/A 05/02/2019    Procedure: COLONOSCOPY;  Surgeon: Yanick Wallis MD;  Location: [facility]; Service: Gastroenterology   • ME LAP,CHOLECYSTECTOMY N/A 8/17/2022    Procedure: CHOLECYSTECTOMY LAPAROSCOPIC;  Surgeon: Adelaida Franco DO;  Location: [facility];  Service: General   • REPLACEMENT TOTAL KNEE         History reviewed  No pertinent family history  I have reviewed and agree with the history as documented      E-Cigarette/Vaping   • E-Cigarette Use Never User      E-Cigarette/Vaping Substances   • Nicotine No    • THC No    • CBD No    • Flavoring No    • Other No    • Unknown No      Social History     Tobacco Use   • Smoking status: Former     Packs/day: 1 50     Types: Cigarettes     Quit date:      Years since quittin 9   • Smokeless tobacco: Never   Vaping Use   • Vaping Use: Never used   Substance Use Topics   • Alcohol use: Yes     Alcohol/week: 4 0 standard drinks     Types: 2 Glasses of wine, 2 Cans of beer per week   • Drug use: Never        Review of Systems   Constitutional: Negative for chills and fever  HENT: Negative for sore throat  Eyes: Negative for visual disturbance  Respiratory: Positive for shortness of breath  Negative for cough  Cardiovascular: Positive for chest pain  Negative for leg swelling  Gastrointestinal: Negative for abdominal pain, constipation, diarrhea, nausea and vomiting  Genitourinary: Negative for dysuria  Musculoskeletal: Negative for back pain and neck pain  Skin: Negative for rash  Neurological: Negative for syncope, light-headedness and headaches  Psychiatric/Behavioral: Negative for agitation  All other systems reviewed and are negative  Physical Exam  ED Triage Vitals   Temperature Pulse Respirations Blood Pressure SpO2   22 1731 22 1731 22 1731 22 1731 22 1731   97 6 °F (36 4 °C) 77 20 153/90 98 %      Temp Source Heart Rate Source Patient Position - Orthostatic VS BP Location FiO2 (%)   22 1731 22 1731 22 2040 22 2040 --   Oral Monitor Lying Right arm       Pain Score       22 2007       6             Orthostatic Vital Signs  Vitals:    22 1731 22 2040 22 2100 22 2300   BP: 153/90 (!) 185/87 142/59 146/95   Pulse: 77 56 (!) 54 (!) 50   Patient Position - Orthostatic VS:  Lying Lying        Physical Exam  Vitals and nursing note reviewed  Constitutional:       General: He is not in acute distress  Appearance: Normal appearance  He is not ill-appearing or toxic-appearing     HENT:      Head: Normocephalic and atraumatic  Right Ear: External ear normal       Left Ear: External ear normal       Nose: Nose normal    Eyes:      General: No scleral icterus  Right eye: No discharge  Left eye: No discharge  Extraocular Movements: Extraocular movements intact  Conjunctiva/sclera: Conjunctivae normal    Cardiovascular:      Rate and Rhythm: Normal rate and regular rhythm  Pulses: Normal pulses  Heart sounds: Normal heart sounds  No murmur heard  No friction rub  No gallop  Pulmonary:      Effort: Pulmonary effort is normal  No respiratory distress  Breath sounds: Normal breath sounds  Chest:      Chest wall: Tenderness present  Comments: There is mild tenderness over the left anterolateral chest wall predominately in the area of ribs 4-5  No obvious deformities or overlying skin changes  Sensation intact  No paradoxical chest wall motion  Abdominal:      General: Abdomen is flat  There is no distension  Palpations: Abdomen is soft  There is no mass  Tenderness: There is no abdominal tenderness  Genitourinary:     Comments: Deferred  Musculoskeletal:         General: Normal range of motion  Cervical back: Normal range of motion  Right lower leg: No tenderness  No edema  Left lower leg: No tenderness  No edema  Skin:     General: Skin is warm and dry  Neurological:      General: No focal deficit present  Mental Status: He is alert     Psychiatric:         Mood and Affect: Mood normal          ED Medications  Medications   acetaminophen (TYLENOL) tablet 975 mg (975 mg Oral Given 12/3/22 2007)   iohexol (OMNIPAQUE) 350 MG/ML injection (SINGLE-DOSE) 100 mL (100 mL Intravenous Given 12/3/22 2202)       Diagnostic Studies  Results Reviewed     Procedure Component Value Units Date/Time    HS Troponin I 2hr [[id number]]  (Normal) Collected: 12/03/22 2215    Lab Status: Final result Specimen: Blood from Arm, Left Updated: 12/03/22 2258     hs TnI 2hr 25 ng/L      Delta 2hr hsTnI -6 ng/L     HS Troponin 0hr (reflex protocol) [[id number]]  (Normal) Collected: 12/03/22 2004    Lab Status: Final result Specimen: Blood from Arm, Left Updated: 12/03/22 2049     hs TnI 0hr 31 ng/L     Comprehensive metabolic panel [[id number]] Collected: 12/03/22 2004    Lab Status: Final result Specimen: Blood from Arm, Left Updated: 12/03/22 2039     Sodium 141 mmol/L      Potassium 3 9 mmol/L      Chloride 108 mmol/L      CO2 28 mmol/L      ANION GAP 5 mmol/L      BUN 18 mg/dL      Creatinine 1 00 mg/dL      Glucose 89 mg/dL      Calcium 9 2 mg/dL      AST 31 U/L      ALT 31 U/L      Alkaline Phosphatase 64 U/L      Total Protein 8 0 g/dL      Albumin 3 5 g/dL      Total Bilirubin 0 55 mg/dL      eGFR 78 ml/min/1 73sq m     Narrative:      [facility] guidelines for Chronic Kidney Disease (CKD):   •  Stage 1 with normal or high GFR (GFR > 90 mL/min/1 73 square meters)  •  Stage 2 Mild CKD (GFR = 60-89 mL/min/1 73 square meters)  •  Stage 3A Moderate CKD (GFR = 45-59 mL/min/1 73 square meters)  •  Stage 3B Moderate CKD (GFR = 30-44 mL/min/1 73 square meters)  •  Stage 4 Severe CKD (GFR = 15-29 mL/min/1 73 square meters)  •  Stage 5 End Stage CKD (GFR <15 mL/min/1 73 square meters)  Note: GFR calculation is accurate only with a steady state creatinine    D-dimer, quantitative [[id number]]  (Abnormal) Collected: 12/03/22 2004    Lab Status: Final result Specimen: Blood from Arm, Left Updated: 12/03/22 2037     D-Dimer, Quant 0 70 ug/ml FEU     Narrative: In the evaluation for possible pulmonary embolism, in the appropriate (Well's Score of 4 or less) patient, the age adjusted d-dimer cutoff for this patient can be calculated as:    Age x 0 01 (in ug/mL) for Age-adjusted D-dimer exclusion threshold for a patient over 50 years      CBC and differential [[id number]] Collected: 12/03/22 2004    Lab Status: Final result Specimen: Blood from Arm, Left Updated: 12/03/22 2021     WBC 7 62 Thousand/uL      RBC 4 67 Million/uL      Hemoglobin 14 5 g/dL      Hematocrit 45 4 %      MCV 97 fL      MCH 31 0 pg      MCHC 31 9 g/dL      RDW 13 6 %      MPV 9 2 fL      Platelets 731 Thousands/uL      nRBC 0 /100 WBCs      Neutrophils Relative 63 %      Immat GRANS % 1 %      Lymphocytes Relative 24 %      Monocytes Relative 9 %      Eosinophils Relative 2 %      Basophils Relative 1 %      Neutrophils Absolute 4 86 Thousands/µL      Immature Grans Absolute 0 05 Thousand/uL      Lymphocytes Absolute 1 84 Thousands/µL      Monocytes Absolute 0 69 Thousand/µL      Eosinophils Absolute 0 13 Thousand/µL      Basophils Absolute 0 05 Thousands/µL                  CTA ED chest PE study   Final Result by Osvaldo Barrios MD (12/03 2347)      No evidence for pulmonary embolus  No consolidation or effusion  4 mm subpleural pulmonary nodule right middle lobe  Based on current Fleischner Society 2017 Guidelines on incidental pulmonary nodule, no routine follow-up is needed if the patient is low risk  If the patient is high risk, optional follow-up chest CT    at 12 months can be considered  Study was marked in Epic for significant notification        Workstation performed: [id number]               Procedures  ECG 12 Lead Documentation Only    Date/Time: 12/3/2022 5:50 PM  Performed by: Park Puckett DO  Authorized by: Park Puckett DO     Indications / Diagnosis:  Chest pain  ECG reviewed by me, the ED Provider: yes    Patient location:  ED  Previous ECG:     Previous ECG:  Compared to current    Similarity:  No change  Interpretation:     Interpretation: abnormal    Rate:     ECG rate:  59    ECG rate assessment: bradycardic    Rhythm:     Rhythm: sinus bradycardia    Ectopy:     Ectopy: none    QRS:     QRS axis:  Left  Conduction:     Conduction: abnormal      Abnormal conduction: complete RBBB    ST segments:     ST segments:  Normal  T waves:     T waves: normal ED Course  ED Course as of 12/04/22 0259   Sat Dec 03, 2022   2045 D-Dimer, Quant(!): 0 70  Will CT for PE     I personally discussed the imaging findings with the patient as outlined below  I read these findings verbatim to the patient  The patient seems to understand these findings and is agreeable to follow up with his primary care physician on or around one week   Findings discussed: Radiology impression: "4 mm subpleural pulmonary nodule right middle lobe  Based on current Fleischner Society 2017 Guidelines on incidental pulmonary nodule, no routine follow-up is needed if the patient is low risk  If the patient is high risk, optional follow-up chest CT at 12 months can be considered "        HEART Risk Score    Flowsheet Row Most Recent Value   Heart Score Risk Calculator    History 0 Filed at: 12/03/2022 2130   ECG 0 Filed at: 12/03/2022 2130   Age 2 Filed at: 12/03/2022 2130   Risk Factors 1 Filed at: 12/03/2022 2130   Troponin 1 Filed at: 12/03/2022 2130   HEART Score 4 Filed at: 12/03/2022 2130                      SBIRT 20yo+    Flowsheet Row Most Recent Value   SBIRT (25 yo +)    In order to provide better care to our patients, we are screening all of our patients for alcohol and drug use  Would it be okay to ask you these screening questions? Yes Filed at: 12/03/2022 1956   Initial Alcohol Screen: US AUDIT-C     1  How often do you have a drink containing alcohol? 0 Filed at: 12/03/2022 1956   2  How many drinks containing alcohol do you have on a typical day you are drinking? 0 Filed at: 12/03/2022 1956   3a  Male UNDER 65: How often do you have five or more drinks on one occasion? 0 Filed at: 12/03/2022 1956   Audit-C Score 0 Filed at: 12/03/2022 1956   JEFFRY: How many times in the past year have you    Used an illegal drug or used a prescription medication for non-medical reasons?  Never Filed at: 12/03/2022 1956          Jono' Criteria for PE    Flowsheet Row Most Recent Value   Delia De La Rosa Criteria for PE    Clinical signs and symptoms of DVT 0 Filed at: 12/03/2022 2013   PE is primary diagnosis or equally likely 0 Filed at: 12/03/2022 2013   HR >100 0 Filed at: 12/03/2022 2013   Immobilization at least 3 days or Surgery in the previous 4 weeks 0 Filed at: 12/03/2022 2013   Previous, objectively diagnosed PE or DVT 0 Filed at: 12/03/2022 2013   Hemoptysis 0 Filed at: 12/03/2022 2013   Malignancy with treatment within 6 months or palliative 0 Filed at: 12/03/2022 2013   Wells' Criteria Total 0 Filed at: 12/03/2022 2013            MDM  Number of Diagnoses or Management Options  Chest pain: new and requires workup  Chest wall pain: new and requires workup  Pulmonary nodule: new and requires workup  Diagnosis management comments: Patient is a 78year old male who presents with chest pain  Differential diagnosis includes chest wall pain, rib fracture, pulmonary contusion, pneumothorax  No evidence of volume overload or shock on exam  ECG without signs of active ischemia  ECG without evidence of STEMI  Low suspicion for acute PE (Wells low risk, see above) however will dimer  Overall, ACS is being considered given history & physical, as well as myocarditis given recent suspected viral illness  HEART score: 4 (see above)  Plan: cardiac monitor, labs, ECG, troponins, pain control, reassess    Patient with slight elevation in troponin, will delta  ECG nonischemic as above  Dimer 0 7, will perform CTA for PE given this finding  CTA negative for dimer, remainder of labs largely unremarkable  Suspect that patient symptoms most consistent with musculoskeletal chest wall pain following fall  Recommend discharge home with primary care follow up  Also gave patient information for cardiology with HEART score of 4 and chest pain  Wrote prescription for voltaren gel  Patient seems to understand this plan and is agreeable  All questions answered  Patient discharged home with return precautions         Amount and/or Complexity of Data Reviewed  Clinical lab tests: ordered and reviewed  Tests in the radiology section of CPT®: ordered and reviewed  Review and summarize past medical records: yes  Independent visualization of images, tracings, or specimens: yes    Patient Progress  Patient progress: stable      Disposition  Final diagnoses:   Chest pain   Chest wall pain   Pulmonary nodule     Time reflects when diagnosis was documented in both MDM as applicable and the Disposition within this note     Time User Action Codes Description Comment    12/3/2022 11:51 PM Julious Banter Add [R07 9] Chest pain     12/3/2022 11:52 PM Yuly Banter Add [R07 89] Chest wall pain     12/3/2022 11:55 PM [place], [address] [R91 1] Pulmonary nodule       ED Disposition     ED Disposition   Discharge    Condition   Stable    Date/Time   Sat Dec 3, 2022 11:51 PM    Comment   Roger Marcos discharge to home/self care                 Follow-up Information     Follow up With Specialties Details Why Contact Info Additional [address]  [facility] in 3 days  [address] [phone]  [facility], [address], [place], [facility]    [address] [facility] Call on 12/5/2022  [address]  [address] Cardiology [address], [address] [place] [address], [address] [place] [address] Av          Discharge Medication List as of 12/3/2022 11:57 PM      START taking these medications    Details   Diclofenac Sodium (VOLTAREN) 1 % Apply 2 g topically 4 (four) times a day, Starting Sat 12/3/2022, Normal         CONTINUE these medications which have NOT CHANGED    Details   albuterol (Ventolin HFA) 90 mcg/act inhaler Inhale 2 puffs every 6 (six) hours as needed for wheezing, Starting Thu 1/21/2021, Normal Alirocumab 75 MG/ML SOAJ INJECT 75MG (ONE PEN-FUL) SUBCUTANEOUSLY EVERY 2 WEEKS, Historical Med      carboxymethylcellulose (REFRESH PLUS) 0 5 % SOLN Administer 1 drop to both eyes 3 (three) times a day, Starting Fri 6/17/2022, Historical Med      cholecalciferol (VITAMIN D3) 1,000 units tablet Take 1,000 Units by mouth daily, Historical Med      ezetimibe (ZETIA) 10 mg tablet TAKE ONE-HALF TABLET BY MOUTH 30 MINUTES PRIOR TO BREAKFAST FOR CHOLESTEROL, Historical Med      hydroxychloroquine (PLAQUENIL) 200 mg tablet Take 200 mg by mouth 2 (two) times a day with meals, Historical Med      losartan (COZAAR) 25 mg tablet Take 12 5 mg by mouth every evening, Historical Med      Multiple Vitamin (multivitamin) capsule Take 1 capsule by mouth daily, Historical Med      Na Sulfate-K Sulfate-Mg Sulf 17 5-3 13-1 6 GM/177ML SOLN As directed, Sample      Omeprazole 20 MG TBEC TAKE ONE CAPSULE BY MOUTH EVERY DAY FOR STOMACH OR REFLUX (TAKE IN THE MORNING 30 MINUTES PRIOR TO BREAKFAST), Historical Med      sildenafil (VIAGRA) 100 mg tablet TAKE ONE-HALF TABLET BY MOUTH EVERY DAY AS NEEDED PRIOR TO SEXUAL ACTIVITY FOR ERECTILE DYSFUNCTION, Historical Med      vitamin B-12 (VITAMIN B-12) 1,000 mcg tablet Take by mouth daily, Historical Med      Zinc 100 MG TABS Take by mouth in the morning, Historical Med           No discharge procedures on file  PDMP Review     None           ED Provider  Attending physically available and evaluated Pawel Rodriguez I managed the patient along with the ED Attending      Electronically Signed by         Silver Chaves DO  12/04/22 5544

## 2023-09-19 PROBLEM — J34.3 NASAL TURBINATE HYPERTROPHY: Status: ACTIVE | Noted: 2023-09-19

## 2023-09-19 PROBLEM — J34.89 NASAL OBSTRUCTION: Status: ACTIVE | Noted: 2023-09-19

## 2023-09-19 PROBLEM — G47.33 OSA (OBSTRUCTIVE SLEEP APNEA): Status: ACTIVE | Noted: 2023-09-19

## 2023-09-19 PROBLEM — J34.2 DNS (DEVIATED NASAL SEPTUM): Status: ACTIVE | Noted: 2023-09-19

## 2024-03-12 ENCOUNTER — HOSPITAL ENCOUNTER (OUTPATIENT)
Dept: RADIOLOGY | Facility: HOSPITAL | Age: 69
Discharge: HOME | End: 2024-03-12
Payer: COMMERCIAL

## 2024-03-12 DIAGNOSIS — M24.812 INTERNAL DERANGEMENT OF LEFT SHOULDER: ICD-10-CM

## 2024-03-12 DIAGNOSIS — M54.2 CHRONIC NECK PAIN: ICD-10-CM

## 2024-03-12 DIAGNOSIS — G89.29 CHRONIC NECK PAIN: ICD-10-CM

## 2024-03-12 PROCEDURE — 73221 MRI JOINT UPR EXTREM W/O DYE: CPT

## 2024-03-12 PROCEDURE — 72141 MRI NECK SPINE W/O DYE: CPT

## 2025-04-30 ENCOUNTER — ANESTHESIA EVENT (OUTPATIENT)
Dept: PERIOP | Facility: HOSPITAL | Age: 70
End: 2025-04-30
Payer: COMMERCIAL

## 2025-04-30 RX ORDER — ASPIRIN 81 MG/1
81 TABLET, CHEWABLE ORAL 2 TIMES DAILY
COMMUNITY
Start: 2024-12-13 | End: 2025-12-13

## 2025-04-30 RX ORDER — OLOPATADINE HYDROCHLORIDE 1 MG/ML
SOLUTION/ DROPS OPHTHALMIC
COMMUNITY
Start: 2025-03-04

## 2025-04-30 RX ORDER — AMOXICILLIN 250 MG
1 CAPSULE ORAL 2 TIMES DAILY
COMMUNITY
Start: 2024-12-13 | End: 2025-12-13

## 2025-04-30 RX ORDER — AMMONIUM LACTATE 12 G/100G
LOTION TOPICAL
COMMUNITY
Start: 2025-03-04

## 2025-04-30 RX ORDER — SELENIUM SULFIDE 2.5 MG/100ML
LOTION TOPICAL
COMMUNITY
Start: 2025-03-04

## 2025-04-30 RX ORDER — AMPICILLIN TRIHYDRATE 250 MG
500 CAPSULE ORAL DAILY
COMMUNITY

## 2025-04-30 RX ORDER — ZINC GLUCONATE 100 MG
25 TABLET ORAL DAILY
COMMUNITY

## 2025-04-30 RX ORDER — METHOCARBAMOL 500 MG/1
TABLET, FILM COATED ORAL
COMMUNITY
Start: 2024-12-13

## 2025-04-30 RX ORDER — METHYLPHENIDATE HYDROCHLORIDE 36 MG/1
18 TABLET, EXTENDED RELEASE ORAL
COMMUNITY

## 2025-04-30 NOTE — PRE-PROCEDURE INSTRUCTIONS
Pre-Surgery Instructions:   Medication Instructions    Chromium Picolinate (CHROMIUM PICOLATE PO) Stop taking 2 days prior to surgery.    Coenzyme Q10 (CO Q 10 PO) Stop taking 2 days prior to surgery.    Iodine, Kelp, (KELP PO) Stop taking 2 days prior to surgery.    methocarbamol (ROBAXIN) 500 mg tablet Uses PRN- OK to take day of surgery    olopatadine (PATANOL) 0.1 % ophthalmic solution Take day of surgery.    Omega-3 Fatty Acids (FISH OIL PO) Stop taking 2 days prior to surgery.    senna-docusate sodium (SENOKOT S) 8.6-50 mg per tablet Uses PRN- OK to take day of surgery    Turmeric (QC TUMERIC COMPLEX PO) Stop taking 2 days prior to surgery.    albuterol (Ventolin HFA) 90 mcg/act inhaler Uses PRN- OK to take day of surgery    Alirocumab 75 MG/ML SOAJ Hold day of surgery. LD 4/15/25     carboxymethylcellulose (REFRESH PLUS) 0.5 % SOLN Take day of surgery.    Cinnamon 500 MG capsule Stop taking 2 days prior to surgery.    hydroxychloroquine (PLAQUENIL) 200 mg tablet Take day of surgery.    losartan (COZAAR) 25 mg tablet Take night before surgery    Methylphenidate HCl ER 36 MG TB24 Hold day of surgery.    sildenafil (VIAGRA) 100 mg tablet HOLD for 24 hours    Zinc 100 MG TABS Stop taking 2 days prior to surgery.    Zinc Gluconate 100 MG TABS Stop taking 2 days prior to surgery.   Medication instructions for day of surgery reviewed. Please take all instructed medications with only a sip of water.       You will receive a call one business day prior to surgery with an arrival time and hospital directions. If your surgery is scheduled on a Monday, the hospital will be calling you on the Friday prior to your surgery. If you have not heard from anyone by 8pm, please call the hospital supervisor through the hospital  at [phone]. ([place] [phone] or [place] [phone]).    Do not eat or drink anything after midnight the night before your surgery, including candy, mints, lifesavers, or chewing gum.  Do not drink alcohol 24hrs before your surgery. Try not to smoke at least 24hrs before your surgery.       Follow the pre surgery showering instructions as listed in the “My Surgical Experience Booklet” or otherwise provided by your surgeon's office. Do not use a blade to shave the surgical area 1 week before surgery. It is okay to use a clean electric clippers up to 24 hours before surgery. Do not apply any lotions, creams, including makeup, cologne, deodorant, or perfumes after showering on the day of your surgery. Do not use dry shampoo, hair spray, hair gel, or any type of hair products.     No contact lenses, eye make-up, or artificial eyelashes. Remove nail polish, including gel polish, and any artificial, gel, or acrylic nails if possible. Remove all jewelry including rings and body piercing jewelry.     Wear causal clothing that is easy to take on and off. Consider your type of surgery.    Keep any valuables, jewelry, piercings at home. Please bring any specially ordered equipment (sling, braces) if indicated.    Arrange for a responsible person to drive you to and from the hospital on the day of your surgery. Please confirm the visitor policy for the day of your procedure when you receive your phone call with an arrival time.     Call the surgeon's office with any new illnesses, exposures, or additional questions prior to surgery.    Please reference your “My Surgical Experience Booklet” for additional information to prepare for your upcoming surgery.

## 2025-05-02 ENCOUNTER — HOSPITAL ENCOUNTER (OUTPATIENT)
Facility: HOSPITAL | Age: 70
Setting detail: OUTPATIENT SURGERY
Discharge: HOME | End: 2025-05-02
Attending: OTOLARYNGOLOGY | Admitting: OTOLARYNGOLOGY
Payer: COMMERCIAL

## 2025-05-02 ENCOUNTER — ANESTHESIA (OUTPATIENT)
Dept: PERIOP | Facility: HOSPITAL | Age: 70
End: 2025-05-02
Payer: COMMERCIAL

## 2025-05-02 VITALS
WEIGHT: 244.05 LBS | TEMPERATURE: 97.2 F | SYSTOLIC BLOOD PRESSURE: 191 MMHG | RESPIRATION RATE: 18 BRPM | HEIGHT: 71 IN | OXYGEN SATURATION: 95 % | BODY MASS INDEX: 34.17 KG/M2 | DIASTOLIC BLOOD PRESSURE: 81 MMHG | HEART RATE: 65 BPM

## 2025-05-02 DIAGNOSIS — J34.89 OBSTRUCTION OF NASAL VALVE: Primary | ICD-10-CM

## 2025-05-02 DIAGNOSIS — J34.89 CONCHA BULLOSA: ICD-10-CM

## 2025-05-02 DIAGNOSIS — J34.2 DEVIATED NASAL SEPTUM: ICD-10-CM

## 2025-05-02 DIAGNOSIS — J34.3 HYPERTROPHY OF BOTH INFERIOR NASAL TURBINATES: ICD-10-CM

## 2025-05-02 DIAGNOSIS — Z98.890 STATUS POST NASAL SURGERY: ICD-10-CM

## 2025-05-02 PROCEDURE — 30802 ABLATE INF TURBINATE SUBMUC: CPT | Performed by: OTOLARYNGOLOGY

## 2025-05-02 PROCEDURE — 30520 REPAIR OF NASAL SEPTUM: CPT | Performed by: OTOLARYNGOLOGY

## 2025-05-02 PROCEDURE — 30465 REPAIR NASAL STENOSIS: CPT | Performed by: OTOLARYNGOLOGY

## 2025-05-02 PROCEDURE — 31240 NSL/SNS NDSC CNCH BULL RESCJ: CPT | Performed by: OTOLARYNGOLOGY

## 2025-05-02 PROCEDURE — 31256 EXPLORATION MAXILLARY SINUS: CPT | Performed by: OTOLARYNGOLOGY

## 2025-05-02 RX ORDER — ACETAMINOPHEN 10 MG/ML
1000 INJECTION, SOLUTION INTRAVENOUS ONCE
Status: COMPLETED | OUTPATIENT
Start: 2025-05-02 | End: 2025-05-02

## 2025-05-02 RX ORDER — CEPHALEXIN 500 MG/1
500 CAPSULE ORAL EVERY 8 HOURS SCHEDULED
Qty: 30 CAPSULE | Refills: 0 | Status: SHIPPED | OUTPATIENT
Start: 2025-05-02 | End: 2025-05-12

## 2025-05-02 RX ORDER — MIDAZOLAM HYDROCHLORIDE 2 MG/2ML
INJECTION, SOLUTION INTRAMUSCULAR; INTRAVENOUS AS NEEDED
Status: DISCONTINUED | OUTPATIENT
Start: 2025-05-02 | End: 2025-05-02

## 2025-05-02 RX ORDER — SODIUM CHLORIDE/ALOE VERA
GEL (GRAM) NASAL AS NEEDED
Status: DISCONTINUED | OUTPATIENT
Start: 2025-05-02 | End: 2025-05-02 | Stop reason: HOSPADM

## 2025-05-02 RX ORDER — ONDANSETRON 2 MG/ML
4 INJECTION INTRAMUSCULAR; INTRAVENOUS ONCE AS NEEDED
Status: DISCONTINUED | OUTPATIENT
Start: 2025-05-02 | End: 2025-05-02 | Stop reason: HOSPADM

## 2025-05-02 RX ORDER — LIDOCAINE HYDROCHLORIDE AND EPINEPHRINE 10; 10 MG/ML; UG/ML
INJECTION, SOLUTION INFILTRATION; PERINEURAL AS NEEDED
Status: DISCONTINUED | OUTPATIENT
Start: 2025-05-02 | End: 2025-05-02 | Stop reason: HOSPADM

## 2025-05-02 RX ORDER — LIDOCAINE HYDROCHLORIDE 20 MG/ML
INJECTION, SOLUTION EPIDURAL; INFILTRATION; INTRACAUDAL; PERINEURAL AS NEEDED
Status: DISCONTINUED | OUTPATIENT
Start: 2025-05-02 | End: 2025-05-02

## 2025-05-02 RX ORDER — CEFAZOLIN SODIUM 2 G/50ML
2000 SOLUTION INTRAVENOUS ONCE
Status: COMPLETED | OUTPATIENT
Start: 2025-05-02 | End: 2025-05-02

## 2025-05-02 RX ORDER — OXYMETAZOLINE HYDROCHLORIDE 0.05 G/100ML
SPRAY NASAL AS NEEDED
Status: DISCONTINUED | OUTPATIENT
Start: 2025-05-02 | End: 2025-05-02 | Stop reason: HOSPADM

## 2025-05-02 RX ORDER — FENTANYL CITRATE 50 UG/ML
INJECTION, SOLUTION INTRAMUSCULAR; INTRAVENOUS AS NEEDED
Status: DISCONTINUED | OUTPATIENT
Start: 2025-05-02 | End: 2025-05-02

## 2025-05-02 RX ORDER — SODIUM CHLORIDE, SODIUM LACTATE, POTASSIUM CHLORIDE, CALCIUM CHLORIDE 600; 310; 30; 20 MG/100ML; MG/100ML; MG/100ML; MG/100ML
125 INJECTION, SOLUTION INTRAVENOUS CONTINUOUS
Status: CANCELLED | OUTPATIENT
Start: 2025-05-02

## 2025-05-02 RX ORDER — PHENYLEPHRINE HCL IN 0.9% NACL 1 MG/10 ML
SYRINGE (ML) INTRAVENOUS AS NEEDED
Status: DISCONTINUED | OUTPATIENT
Start: 2025-05-02 | End: 2025-05-02

## 2025-05-02 RX ORDER — HYDROMORPHONE HCL/PF 1 MG/ML
0.5 SYRINGE (ML) INJECTION
Status: DISCONTINUED | OUTPATIENT
Start: 2025-05-02 | End: 2025-05-02 | Stop reason: HOSPADM

## 2025-05-02 RX ORDER — FENTANYL CITRATE/PF 50 MCG/ML
25 SYRINGE (ML) INJECTION
Status: DISCONTINUED | OUTPATIENT
Start: 2025-05-02 | End: 2025-05-02 | Stop reason: HOSPADM

## 2025-05-02 RX ORDER — PROPOFOL 10 MG/ML
INJECTION, EMULSION INTRAVENOUS AS NEEDED
Status: DISCONTINUED | OUTPATIENT
Start: 2025-05-02 | End: 2025-05-02

## 2025-05-02 RX ORDER — DEXAMETHASONE SODIUM PHOSPHATE 10 MG/ML
INJECTION, SOLUTION INTRAMUSCULAR; INTRAVENOUS AS NEEDED
Status: DISCONTINUED | OUTPATIENT
Start: 2025-05-02 | End: 2025-05-02

## 2025-05-02 RX ORDER — MAGNESIUM HYDROXIDE 1200 MG/15ML
LIQUID ORAL AS NEEDED
Status: DISCONTINUED | OUTPATIENT
Start: 2025-05-02 | End: 2025-05-02 | Stop reason: HOSPADM

## 2025-05-02 RX ORDER — ONDANSETRON 2 MG/ML
4 INJECTION INTRAMUSCULAR; INTRAVENOUS EVERY 6 HOURS PRN
Status: CANCELLED | OUTPATIENT
Start: 2025-05-02

## 2025-05-02 RX ORDER — MEPERIDINE HYDROCHLORIDE 25 MG/ML
12.5 INJECTION INTRAMUSCULAR; INTRAVENOUS; SUBCUTANEOUS
Status: DISCONTINUED | OUTPATIENT
Start: 2025-05-02 | End: 2025-05-02 | Stop reason: HOSPADM

## 2025-05-02 RX ORDER — OXYCODONE AND ACETAMINOPHEN 5; 325 MG/1; MG/1
2 TABLET ORAL EVERY 4 HOURS PRN
Refills: 0 | Status: DISCONTINUED | OUTPATIENT
Start: 2025-05-02 | End: 2025-05-02 | Stop reason: HOSPADM

## 2025-05-02 RX ORDER — EPHEDRINE SULFATE 50 MG/ML
INJECTION INTRAVENOUS AS NEEDED
Status: DISCONTINUED | OUTPATIENT
Start: 2025-05-02 | End: 2025-05-02

## 2025-05-02 RX ORDER — SODIUM CHLORIDE, SODIUM LACTATE, POTASSIUM CHLORIDE, CALCIUM CHLORIDE 600; 310; 30; 20 MG/100ML; MG/100ML; MG/100ML; MG/100ML
125 INJECTION, SOLUTION INTRAVENOUS CONTINUOUS
Status: DISCONTINUED | OUTPATIENT
Start: 2025-05-02 | End: 2025-05-02 | Stop reason: HOSPADM

## 2025-05-02 RX ORDER — ROCURONIUM BROMIDE 10 MG/ML
INJECTION, SOLUTION INTRAVENOUS AS NEEDED
Status: DISCONTINUED | OUTPATIENT
Start: 2025-05-02 | End: 2025-05-02

## 2025-05-02 RX ORDER — SODIUM CHLORIDE, SODIUM LACTATE, POTASSIUM CHLORIDE, CALCIUM CHLORIDE 600; 310; 30; 20 MG/100ML; MG/100ML; MG/100ML; MG/100ML
20 INJECTION, SOLUTION INTRAVENOUS CONTINUOUS
Status: DISCONTINUED | OUTPATIENT
Start: 2025-05-02 | End: 2025-05-02 | Stop reason: HOSPADM

## 2025-05-02 RX ORDER — ONDANSETRON 2 MG/ML
INJECTION INTRAMUSCULAR; INTRAVENOUS AS NEEDED
Status: DISCONTINUED | OUTPATIENT
Start: 2025-05-02 | End: 2025-05-02

## 2025-05-02 RX ORDER — OXYCODONE AND ACETAMINOPHEN 5; 325 MG/1; MG/1
1 TABLET ORAL EVERY 4 HOURS PRN
Qty: 20 TABLET | Refills: 0 | Status: SHIPPED | OUTPATIENT
Start: 2025-05-02

## 2025-05-02 RX ADMIN — DEXAMETHASONE SODIUM PHOSPHATE 10 MG: 10 INJECTION, SOLUTION INTRAMUSCULAR; INTRAVENOUS at 11:00

## 2025-05-02 RX ADMIN — Medication 100 MCG: at 11:59

## 2025-05-02 RX ADMIN — ROCURONIUM 50 MG: 50 INJECTION, SOLUTION INTRAVENOUS at 10:55

## 2025-05-02 RX ADMIN — ACETAMINOPHEN 1000 MG: 10 INJECTION INTRAVENOUS at 09:04

## 2025-05-02 RX ADMIN — CEFAZOLIN SODIUM 2000 MG: 2 SOLUTION INTRAVENOUS at 10:49

## 2025-05-02 RX ADMIN — PROPOFOL 200 MG: 10 INJECTION, EMULSION INTRAVENOUS at 10:55

## 2025-05-02 RX ADMIN — ONDANSETRON 4 MG: 2 INJECTION INTRAMUSCULAR; INTRAVENOUS at 11:00

## 2025-05-02 RX ADMIN — MIDAZOLAM HYDROCHLORIDE 2 MG: 1 INJECTION, SOLUTION INTRAMUSCULAR; INTRAVENOUS at 10:50

## 2025-05-02 RX ADMIN — LIDOCAINE HYDROCHLORIDE 50 MG: 20 INJECTION, SOLUTION EPIDURAL; INFILTRATION; INTRACAUDAL at 10:55

## 2025-05-02 RX ADMIN — FENTANYL CITRATE 100 MCG: 50 INJECTION INTRAMUSCULAR; INTRAVENOUS at 10:52

## 2025-05-02 RX ADMIN — SODIUM CHLORIDE, SODIUM LACTATE, POTASSIUM CHLORIDE, AND CALCIUM CHLORIDE 125 ML/HR: .6; .31; .03; .02 INJECTION, SOLUTION INTRAVENOUS at 09:00

## 2025-05-02 RX ADMIN — Medication 100 MCG: at 11:49

## 2025-05-02 RX ADMIN — SUGAMMADEX 200 MG: 100 INJECTION, SOLUTION INTRAVENOUS at 13:16

## 2025-05-02 RX ADMIN — SODIUM CHLORIDE, SODIUM LACTATE, POTASSIUM CHLORIDE, AND CALCIUM CHLORIDE: .6; .31; .03; .02 INJECTION, SOLUTION INTRAVENOUS at 11:49

## 2025-05-02 RX ADMIN — EPHEDRINE SULFATE 10 MG: 50 INJECTION INTRAVENOUS at 12:05

## 2025-05-02 NOTE — OP NOTE
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 BILATERAL MAXILLARY ANTROSTOMY. 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   Bilateral maxillary sinuses with accessory ostia       Complications:   None    Procedure and Technique:  Patient was met in holding area, positively identified and risks, benefits and alternatives discussed, Patient confirmed informed consent. Oyxmetazoline nasal spray was applied to bilateral nasal cavities.     The patient was transferred onto the operating table in the supine position. Appropriate monitoring devices were put in place, general anesthesia was administered by anesthesiologist and patient intubated without complications, tube taped in midline.   The patient was prepped and draped in the usual clean fashion. Before proceeding further, a time-out was taken during which the patient’s identification and planned surgical procedure were  confirmed.    Examination with a speculum confirmed severe obstruction bilaterally, topical oxymetazoline applied with cotton pledgets and 1% lidocaine with 1/100,000 epinephrine was injected to the septum on  right caudal edge. Septoplasty hemitransfixion  incision along the caudal margin of the septum on the right  side was performed with the 15 blade.  Mucoperichondrial and mucoperiosteal flaps were elevated with the iris scissors and freer elevator bilaterally, mucoperiosteal  flap was elevated posteriorly with freer, mucosal tears noted bilaterally. Basal deviated cartilage was resected with freer and cartilage preserved in sterile saline as source of grafting material. The bony spur posteriorly was resected by cutting above and below with septal Manning scissors and removing septal posterior wedge with pituitary forceps. The inferior deviated edge of perpendicular plate of ethmoid at chondroethmoidal junction was resected with Jantzen-Marc rongeurs. Nasal crest medialized with greenstick fracture using a straight [place] forceps.     The surgery continued with 0 degree scope, 1% lidocaine with epinephrine 1:100 000 was injected to head and body of middle turbinates. The same solution was injected to posterior lateral wall with a spinal needle.  After allowing time for anesthetic and vasoconstrictive effect, attention was directed to left nasal cavity, the middle turbinate was medialized. A 30 degree endoscope was used to visualize lateral wall and ball tip seeker was used to bluntly luxate the uncinate, then the maxillary ostium was identified and dilated. Subsequently joined to with accessory ostium using Stammberger back biting forceps. The microdebrider was used to complete the uncinectomy and enlarge the ostium to complete the antrostomy. The ostium was noted to be large to allow introduction of the 30 degree scope and visualization of the maxillary sinus mucosa. Attention was then directed to right  middle meatus, an accessory ostium also visualized and and ball tip seeker was used to bluntly luxate the uncinate, then the maxillary ostium was identified and dilated. Subsequently joined to with accessory ostium with microdebrider also used to complete the uncinectomy and enlarge the ostium to complete the antrostomy. The ostium was noted to be large to allow introduction of the 30 degree scope and visualization of the maxillary sinus mucosa.      Inferior turbinates were reduced by submucosal coblation and outfracturing them.Bilateral nasal cavities were then re-examined, and the longest nasal speculum could be passed back along the septum on both sides without meeting any resistance.    Septal excised cartilage harvested was carved for  grafts that were inset between septal cartilage and upper lateral cartilages and fixed with 4/0 plain gut transfixion stitches,  reinforcing and widening nasal valve bilaterally.   The septoplasty incision was closed using  4-0 chromic stitches. The mucoperichondral flaps were sutured with mattress type 4/0 plain gut with small Seb needle.  Middle turbinates were also medialized with a 4/0 plain gut stitch,. Silicone septal splints were then placed and sutured with 3/0 Prolene.    The nasopharynx was then suctioned free of blood and secretions.  Patient was handed to the anesthesiologist who weaned from anesthesia, and extubated patient. All counts were correct. Patient was awakened, and taken to the recovery room in stable condition. All counts were correct at the end of the case, and no complications were encountered.    I was present for the entire procedure.    Patient Disposition:  PACU          SIGNATURE: Adam Cleary MD  DATE: May 2, 2025  TIME: 2:59 PM

## 2025-05-02 NOTE — ANESTHESIA POSTPROCEDURE EVALUATION
Post-Op Assessment Note    CV Status:  Stable    Pain management: adequate       Mental Status:  Alert and awake   Hydration Status:  Euvolemic   PONV Controlled:  Controlled   Airway Patency:  Patent     Post Op Vitals Reviewed: Yes    No anethesia notable event occurred.    Staff: Anesthesiologist           Last Filed PACU Vitals:  Vitals Value Taken Time   Temp 97 °F (36.1 °C) 05/02/25 1332   Pulse 78 05/02/25 1343   /87 05/02/25 1332   Resp 17 05/02/25 1332   SpO2 95 % 05/02/25 1343   Vitals shown include unfiled device data.    Modified Elliott:     Vitals Value Taken Time   Activity 2 05/02/25 1332   Respiration 2 05/02/25 1332   Circulation 2 05/02/25 1332   Consciousness 1 05/02/25 1332   Oxygen Saturation 1 05/02/25 1332     Modified Elliott Score: 8

## 2025-05-02 NOTE — ANESTHESIA PREPROCEDURE EVALUATION
Procedure:  SEPTOPLASTY (Nose)  TURBINOPLASTY (Nose)  ENDOSCOPIC SINUS SURGERY WITH LEFT KRYSTAL, RIGHT MAXILLARY ANTROSTOMY, RIGHT  GRAFT (Nose)    Relevant Problems   ANESTHESIA (within normal limits)      CARDIO  Resting ECG: ECG is abnormal. The ECG shows normal sinus rhythm.   ·  Blood pressure demonstrated a normal response and heart rate   demonstrated a normal response to stress.   ·  Stress Findings: A Ramon protocol stress test was performed. The   patient reached stage 3 of the protocol after exercising for 6 min and 12   sec. The patient had a maximal HR of 155 bpm (102% of MPHR) 8.0 METS. The   patient experienced no angina during the test. The test was stopped   because the patient experienced fatigue and dyspnea.   ·  Stress ECG: No significant changes beyond baseline abnormalities. The   ECG was negative for ischemia.   ·  Echo Post Stress: The left ventricle systolic function is hyperdynamic   post-stress. The post-stress echo showed normal wall motion which was   hyperdynamic compared to baseline.   ·  Study Impression: Negative exercise ECG for ischemia and negative   stress echo for ischemia.      (+) Essential hypertension      GI/HEPATIC   (+) Gastroesophageal reflux disease without esophagitis      MUSCULOSKELETAL  S/p TKR   (+) Rheumatoid arthritis of multiple sites with negative rheumatoid factor (HCC)      PULMONARY   (+) VICKY (obstructive sleep apnea)      Ear/Nose/Throat   (+) Nasal obstruction   (+) Nasal turbinate hypertrophy      Other   (+) Seasonal allergies        Physical Exam    Airway    Mallampati score: III         Dental   No notable dental hx implants,     Cardiovascular  Cardiovascular exam normal    Pulmonary  Pulmonary exam normal Breath sounds clear to auscultation    Other Findings  Upper R/L implants       Anesthesia Plan  ASA Score- 3     Anesthesia Type- general with ASA Monitors.         Additional Monitors:     Airway Plan: ETT.    Comment: Please support neck  when lying flat .       Plan Factors-    Chart reviewed.  Imaging results reviewed. Existing labs reviewed. Patient summary reviewed.    Patient is not a current smoker.              Induction- intravenous.    Postoperative Plan-         Informed Consent- Anesthetic plan and risks discussed with patient.        NPO Status:  No vitals data found for the desired time range.

## 2025-05-02 NOTE — H&P
[facility]  Bladimir Bryant 70 y.o. male MRN: [id number]  Encounter: [id number]  Adam Cleary MD  Office : [phone]  Also available on [employer]    Thank you for referring Bladimir Bryant for an evaluation. My recommendations are included. Please do not hesitate to contact me with any questions you may have.       ASSESSMENT AND PLAN:      1. Deviated nasal septum          2. Chronic nasal congestion          3. Hypertrophy of both inferior nasal turbinates          4. Geetha bullosa          5. Obstruction of nasal valve          6. Osteoma of external ear canal          7. Noise-induced hearing loss of both ears          8. Obstructive sleep apnea on CPAP      Patient with chronic nasal congestion, poor response to Flonase, requires a septoplasty, turbinoplasty, left geetha, right maxillary antrostomy and right  graft.  Risks, benefits and alternatives of surgery discussed in detail. Expected perioperative course and care also reviewed. Questions answered as needed. Patient decides to proceed with surgery and signes informed consent.     The surgery will resolve his nasal obstruction issues and facilitate also the use of his CPAP.  He does have osteomas of the external auditory canal.  Audiometry reveals excellent hearing. No impact of the osteomas on hearing level, no infection, no need to remove osteomas  ______________________________________________________________________    Reason for consultation : nasal congestion    HPI: Bladimir Bryant is a 70 y.o. male who presents with chronic nasal obstruction that has been present for many years.  Appears to be worse on the left nasal cavity.  He was seen by an ENT many years ago and told that he had a deviated septum.  In addition to the nasal obstruction he is now having difficulty using the CPAP machine due to the nasal congestion. Patient reports that he has allergy type symptoms involving nose and left eye worse on  "pollen season.  Allergy testing \"many years ago\", allergic to pine trees and very active allergies as a young adult in the fall. Patient has been on Flonase prescribed in the past, but he has found that it does not really work well and eventually stopped using it.    PRIOR VISIT, ENDOSCOPIC EVALUATION :     REVIEW OF SYSTEMS:    Review of systems:  10 Point ROS was performed and negative except as above or otherwise noted in the medical record.    Historical Information   Past Medical History:   Diagnosis Date    Asthma     Bronchitis     Bundle branch block     Colon polyp     Frozen shoulder     right    GERD (gastroesophageal reflux disease)     Hyperlipidemia     Hypertension     Hypertension     RA (rheumatoid arthritis) (HCC)     Scoliosis     Seasonal allergies     Sleep apnea      Past Surgical History:   Procedure Laterality Date    APPENDECTOMY      COLONOSCOPY      DENTAL IMPLANT      EGD      JOINT REPLACEMENT      KNEE ARTHROSCOPY      WA COLONOSCOPY FLX DX W/COLLJ SPEC WHEN PFRMD N/A 2019    Procedure: COLONOSCOPY;  Surgeon: Rico Mancini MD;  Location: [facility];  Service: Gastroenterology    WA LAPAROSCOPY SURG CHOLECYSTECTOMY N/A 2022    Procedure: CHOLECYSTECTOMY LAPAROSCOPIC;  Surgeon: Thuy Franco DO;  Location: [facility];  Service: General    REPLACEMENT TOTAL KNEE      SHOULDER ARTHROPLASTY Left     TONSILLECTOMY       Social History   Social History     Substance and Sexual Activity   Alcohol Use Yes    Alcohol/week: 4.0 standard drinks of alcohol    Types: 2 Glasses of wine, 2 Cans of beer per week     Social History     Substance and Sexual Activity   Drug Use Never     Social History     Tobacco Use   Smoking Status Former    Current packs/day: 0.00    Average packs/day: 1.5 packs/day for 17.0 years (25.5 ttl pk-yrs)    Types: Cigarettes    Start date:     Quit date:     Years since quittin.3   Smokeless Tobacco Never     Family History   Problem Relation Age " "of Onset    Stroke Father     Heart disease Father     Hypertension Father     Diabetes Father     Heart disease Brother     Hypertension Brother     Diabetes Brother        Meds/Allergies       Current Facility-Administered Medications:     ceFAZolin (ANCEF) IVPB (premix in dextrose) 2,000 mg 50 mL, 2,000 mg, Intravenous, Once    lactated ringers infusion, 125 mL/hr, Intravenous, Continuous, 125 mL/hr at 05/02/25 0900    lactated ringers infusion, 20 mL/hr, Intravenous, Continuous    Allergies   Allergen Reactions    Atorvastatin Myalgia    Colestipol Myalgia    Crestor  [Rosuvastatin Calcium] Myalgia    Gadolinium Other (See Comments)    Monascus Purpureus Went Yeast Myalgia    Statins Myalgia         PHYSICAL EXAM:    Blood pressure (!) 175/77, pulse 60, temperature 98 °F (36.7 °C), temperature source Temporal, resp. rate 16, height 5' 10.75\" (1.797 m), weight 111 kg (244 lb 0.8 oz), SpO2 94%. Body mass index is 34.28 kg/m².  Constitutional: Oriented to person, place, and time. Well-developed and well-nourished, no apparent distress, non-toxic appearance. Cooperative, able to hear and answer questions without difficulty.    Voice: Normal voice quality.  Head: Normocephalic, atraumatic.  No scars, masses or lesions.  Face: Symmetric, no edema, no sinus tenderness.  Eyes: Vision grossly intact, extra-ocular movement intact.  Right Ear: External ear normal.  Auditory canal with no cerumen, there are relatively large osteomas partially obscuring the visualization of the tympanic membrane.  No erythema, no evidence of infection associated to the large osteomas.  Tympanic membrane partially visualized, well-appearing, without retraction or scarring.  No fluid present. No post-auricular erythema or tenderness  Left Ear: External ear normal.  Auditory canal also with large osteomas partially obscuring visualization of the tympanic membrane.  Tympanic membrane well-appearing, without retraction or scarring.  No fluid " present.  No post-auricular erythema or tenderness.  Nose: Septum right deviation anteriorly causing very narrow right nasal valve, Q-tip test and caudal are positive.  The septum then has a convexity to the left with a strong angle at the chondral ethmoidal junction, obscures visualization of the middle turbinate.  Inferior turbinate with hypertrophy.  Right nasal cavity with a very large posterior spur contacting the body of the inferior turbinate and extending towards the middle meatus.  Mucosa moist, turbinates well appearing.  No crusting, polyps or discharge evident.  Oral cavity: Dentition with some teeth in poor condition..  Mucosa moist, lips normal.  Tongue mobile, floor of mouth normal.  Hard palate unremarkable.  No masses or lesions.   Oropharynx: Uvula is midline, soft palate normal, elongated uvula.  Unremarkable oropharyngeal inlet.  Tonsillectomy scar.  Posterior pharyngeal wall clear. No masses or lesions.  Salivary glands:  Parotid glands and submandibular glands symmetric, no enlargement or tenderness.  Neck: Normal laryngeal elevation with swallow.  Trachea midline.  No masses or lesions. No palpable adenopathy.  Thyroid: normal in size, unremarkable without tenderness or palpable nodules.  Pulmonary/Chest: Normal effort and rate. No respiratory distress. Heart: S1 S2 RRR. Lungs CTAB  Musculoskeletal: Normal range of motion. Extremities are normal on exam   Neurological: Cranial nerves 2-12 intact.  Abdomen: Soft, non tender  Skin: Skin is warm and dry.   Psychiatric: Normal mood and affect.      Nasal endoscopy 4/1/25 :     Verbal informed consent obtained  Endoscope was advanced on the left nasal cavity, see angled deviation at the chondral ethmoidal junction obstructs visualization of the head of the middle turbinate.  The endoscope gently advanced immediately below the deviated septum and body and tail of the middle turbinate can be visualized and definitely enlarged.  No polyps or discharge  in the middle meatus.  Sphenoethmoid recess clear on the left side.  Endoscope is then advanced on the right nasal cavity.  The large spur is visualized reaching the lateral wall of the middle meatus.  The accessory ostium is also visualized, middle meatus without any polyps or discharge.  Sphenoethmoid recess cannot be visualized.  Endoscope is advanced along the floor and nasopharynx is free of disease, no mass lesions, no ulcerations, no exudate noticed.  The endoscope was then removed without complication patient tolerates procedure.      Imaging Studies: I have personally reviewed images on the PACS system and :    CT angiogram from 2/16/2021 reveals deviated septum anteriorly to the left with perpendicular plate of the ethmoid completely off midline.  Subsequently the septum has a right deviation with a posterior spur that insinuates into the middle meatus.  There is compensatory hypertrophy of turbinates and in particular the left large geetha bullosa.  Ethmoid cells are clear.  Frontal sinuses are clear no evidence of sinusitis on the maxillary sinuses either.  3 implants are noticed on the right maxilla.  Sphenoid sinuses are healthy and completely clear.  Of note there is an accessory ostium on the right maxillary sinus.    Audiometry  4/1/25 Excellent hearing level, PTA 7/8 with type A tymp left , Type As right. SRT 10 dB with 96/100%

## (undated) DEVICE — SUT MONOCRYL 4-0 PS-2 18 IN Y496G

## (undated) DEVICE — TROCAR: Brand: KII FIOS FIRST ENTRY

## (undated) DEVICE — TUBING SMOKE EVAC W/FILTRATION DEVICE PLUMEPORT ACTIV

## (undated) DEVICE — TROCAR: Brand: KII® SLEEVE

## (undated) DEVICE — GLOVE SRG BIOGEL ORTHOPEDIC 7

## (undated) DEVICE — GLOVE SRG BIOGEL 5.5

## (undated) DEVICE — SCD SEQUENTIAL COMPRESSION COMFORT SLEEVE MEDIUM KNEE LENGTH: Brand: KENDALL SCD

## (undated) DEVICE — SUT PLAIN 4-0 SC-1 18 IN 1828H

## (undated) DEVICE — STERILE NASAL PACK: Brand: CARDINAL HEALTH

## (undated) DEVICE — ENDOCUFF VISION LRG GREEN ID 11.2

## (undated) DEVICE — INTENDED FOR TISSUE SEPARATION, AND OTHER PROCEDURES THAT REQUIRE A SHARP SURGICAL BLADE TO PUNCTURE OR CUT.: Brand: BARD-PARKER SAFETY BLADES SIZE 11, STERILE

## (undated) DEVICE — GLOVE INDICATOR UNDERGLOVE SZ 6 BLUE

## (undated) DEVICE — SUT MONOCRYL 4-0 P-3 18 IN Y494G

## (undated) DEVICE — MAYO STAND COVER: Brand: CONVERTORS

## (undated) DEVICE — NEEDLE SPINAL 22G X 3.5IN  QUINCKE

## (undated) DEVICE — ABDOMINAL PAD: Brand: DERMACEA

## (undated) DEVICE — ELECTRODE LAP J HOOK E-Z CLEAN 33CM-0021

## (undated) DEVICE — ADHESIVE SKIN HIGH VISCOSITY EXOFIN 1ML

## (undated) DEVICE — SUT PROLENE 3-0 SH 48 IN 8534H

## (undated) DEVICE — WAND COBLATION REFLEX ULTRA 45

## (undated) DEVICE — BLADE 1884004HR TRICUT 5PK M4 4MM ROTATE: Brand: TRICUT

## (undated) DEVICE — TISSUE RETRIEVAL SYSTEM: Brand: INZII RETRIEVAL SYSTEM

## (undated) DEVICE — PACK PBDS LAP CHOLE RF

## (undated) DEVICE — LIGACLIP 10-M/L, 10MM ENDOSCOPIC ROTATING MULTIPLE CLIP APPLIERS: Brand: LIGACLIP

## (undated) DEVICE — ANTI-FOG SOLUTION WITH FOAM PAD: Brand: DEVON

## (undated) DEVICE — PENCIL ELECTROSURG E-Z CLEAN -0035H

## (undated) DEVICE — TROCARS: Brand: KII® BALLOON BLUNT TIP SYSTEM

## (undated) DEVICE — NEURO PATTIES 1/2 X 3